# Patient Record
Sex: FEMALE | Race: WHITE | NOT HISPANIC OR LATINO | Employment: PART TIME | URBAN - METROPOLITAN AREA
[De-identification: names, ages, dates, MRNs, and addresses within clinical notes are randomized per-mention and may not be internally consistent; named-entity substitution may affect disease eponyms.]

---

## 2019-03-06 ENCOUNTER — OFFICE VISIT (OUTPATIENT)
Dept: FAMILY MEDICINE CLINIC | Facility: CLINIC | Age: 74
End: 2019-03-06
Payer: MEDICARE

## 2019-03-06 VITALS
DIASTOLIC BLOOD PRESSURE: 78 MMHG | HEART RATE: 105 BPM | HEIGHT: 65 IN | OXYGEN SATURATION: 98 % | WEIGHT: 185 LBS | RESPIRATION RATE: 18 BRPM | SYSTOLIC BLOOD PRESSURE: 136 MMHG | TEMPERATURE: 99.1 F | BODY MASS INDEX: 30.82 KG/M2

## 2019-03-06 DIAGNOSIS — J30.2 SEASONAL ALLERGIES: ICD-10-CM

## 2019-03-06 DIAGNOSIS — R10.11 ABDOMINAL PAIN, RUQ: ICD-10-CM

## 2019-03-06 DIAGNOSIS — R53.83 OTHER FATIGUE: ICD-10-CM

## 2019-03-06 DIAGNOSIS — Z12.11 COLON CANCER SCREENING: ICD-10-CM

## 2019-03-06 DIAGNOSIS — R05.9 COUGH: ICD-10-CM

## 2019-03-06 DIAGNOSIS — Z12.39 BREAST CANCER SCREENING: Primary | ICD-10-CM

## 2019-03-06 DIAGNOSIS — E78.5 HYPERLIPIDEMIA, UNSPECIFIED HYPERLIPIDEMIA TYPE: ICD-10-CM

## 2019-03-06 PROCEDURE — 99204 OFFICE O/P NEW MOD 45 MIN: CPT | Performed by: NURSE PRACTITIONER

## 2019-03-06 NOTE — PROGRESS NOTES
[facility]        NAME: Noemi Reddy is a 69 y o  female  : 1945    MRN: [id number]  DATE: 2019  TIME: 3:58 PM    Assessment and Plan   Breast cancer screening [Z12 31]  1  Breast cancer screening  Mammo screening bilateral w 3d & cad   2  Colon cancer screening  Ambulatory referral to Gastroenterology   3  Other fatigue  TSH, 3rd generation with Free T4 reflex   4  Abdominal pain, RUQ  Comprehensive metabolic panel    CBC and differential    Lipid panel   5  Cough  Lipid panel   6  Seasonal allergies  CBC and differential   7  Hyperlipidemia, unspecified hyperlipidemia type  Lipid panel       Gas pain vs biliary colic- no recent pain problems- will continue to monitor  Cough most likely due to allergies and PND- continue claritin and start flonase  Will check labs and call with results/follow up at visit in 1 month  Patient Instructions     Patient Instructions   Can try taking simethicone (gas x or beano) when having abdominal pain  Use flonase daily  Use humidifer at night  Fasting at least 8 hours for blood work  Return in 1 month for medicare wellness  Chief Complaint     Chief Complaint   Patient presents with    Abdominal Pain    Cough         History of Present Illness       Patient is a new patient  Has not had a physical in years  Reports never had any wellness testing done  Here today with abdominal pain and ongoing cough  Abdominal Pain   This is a new problem  The onset quality is sudden  The problem occurs intermittently  The pain is located in the RUQ  The quality of the pain is aching, burning and a sensation of fullness  The abdominal pain does not radiate  Associated symptoms include belching  Pertinent negatives include no anorexia, arthralgias, constipation, diarrhea, dysuria, fever, flatus, frequency, headaches, hematuria, melena, myalgias, nausea, vomiting or weight loss  Nothing aggravates the pain   The pain is relieved by nothing  Treatments tried: tums, unsure if they helped  There is no history of abdominal surgery, gallstones, GERD or pancreatitis  Cough   This is a chronic problem  The current episode started more than 1 year ago  The problem has been unchanged  The cough is non-productive  Associated symptoms include postnasal drip  Pertinent negatives include no chest pain, chills, ear pain, eye redness, fever, headaches, myalgias, rash, rhinorrhea, sore throat, shortness of breath, weight loss or wheezing  Treatments tried: claritin daily, used to use flonase but stopped because it was a steroid  Her past medical history is significant for bronchitis  There is no history of asthma, bronchiectasis, COPD, environmental allergies or pneumonia  Fatigue   This is a new problem  The current episode started more than 1 month ago  The problem occurs rarely  The problem has been unchanged  Associated symptoms include abdominal pain, coughing and fatigue  Pertinent negatives include no anorexia, arthralgias, chest pain, chills, congestion, fever, headaches, myalgias, nausea, rash, sore throat or vomiting  Nothing aggravates the symptoms  She has tried nothing for the symptoms  Review of Systems   Review of Systems   Constitutional: Positive for fatigue  Negative for activity change, appetite change, chills, fever and weight loss  HENT: Positive for postnasal drip  Negative for congestion, ear discharge, ear pain, rhinorrhea, sinus pressure, sinus pain, sore throat and voice change  Eyes: Negative for discharge and redness  Respiratory: Positive for cough  Negative for chest tightness, shortness of breath and wheezing  Cardiovascular: Negative for chest pain  Gastrointestinal: Positive for abdominal pain  Negative for anorexia, constipation, diarrhea, flatus, melena, nausea and vomiting  Genitourinary: Negative for dysuria, frequency and hematuria  Musculoskeletal: Negative for arthralgias and myalgias     Skin: Negative for color change and rash  Allergic/Immunologic: Negative for environmental allergies  Neurological: Negative for dizziness and headaches  Hematological: Negative for adenopathy  Current Medications     No current outpatient medications on file  Current Allergies     Allergies as of 03/06/2019    (No Known Allergies)            The following portions of the patient's history were reviewed and updated as appropriate: allergies, current medications, past family history, past medical history, past social history, past surgical history and problem list      No past medical history on file  Past Surgical History:   Procedure Laterality Date    WISDOM TOOTH EXTRACTION         Family History   Problem Relation Age of Onset    Lung cancer Father     Lung cancer Sister     Breast cancer Sister          Medications have been verified  BMI Counseling: Body mass index is 30 79 kg/m²  Discussed the patient's BMI with her  The BMI is above average  BMI counseling and education was provided to the patient  Nutrition recommendations include 3-5 servings of fruits/vegetables daily and consuming healthier snacks  Objective   /78   Pulse 105   Temp 99 1 °F (37 3 °C) (Tympanic)   Resp 18   Ht 5' 5" (1 651 m)   Wt 83 9 kg (185 lb)   SpO2 98%   BMI 30 79 kg/m²        Physical Exam     Physical Exam   Constitutional: She is oriented to person, place, and time  Vital signs are normal  She appears well-developed and well-nourished  She is cooperative  She does not appear ill  No distress  HENT:   Head: Normocephalic and atraumatic  Right Ear: Tympanic membrane, external ear and ear canal normal    Left Ear: Tympanic membrane, external ear and ear canal normal    Nose: Nose normal  No mucosal edema or rhinorrhea  Mouth/Throat: Uvula is midline, oropharynx is clear and moist and mucous membranes are normal  No posterior oropharyngeal edema or posterior oropharyngeal erythema  Eyes: Pupils are equal, round, and reactive to light  Conjunctivae, EOM and lids are normal    Cardiovascular: Normal rate, regular rhythm, S1 normal, S2 normal, normal heart sounds and intact distal pulses  Exam reveals no gallop and no friction rub  No murmur heard  Pulmonary/Chest: Effort normal and breath sounds normal  No accessory muscle usage or stridor  No respiratory distress  She has no decreased breath sounds  She has no wheezes  Abdominal: Soft  Normal appearance and bowel sounds are normal  She exhibits no distension and no mass  There is no tenderness  There is no rebound and no guarding  Musculoskeletal: Normal range of motion  She exhibits no edema, tenderness or deformity  Neurological: She is alert and oriented to person, place, and time  Skin: Skin is warm  Capillary refill takes less than 2 seconds  No rash noted  She is not diaphoretic  No erythema  Psychiatric: She has a normal mood and affect  Her behavior is normal  Thought content normal    Nursing note and vitals reviewed

## 2019-03-06 NOTE — PATIENT INSTRUCTIONS
Can try taking simethicone (gas x or beano) when having abdominal pain  Use flonase daily  Use humidifer at night  Fasting at least 8 hours for blood work  Return in 1 month for medicare wellness

## 2019-05-02 ENCOUNTER — HOSPITAL ENCOUNTER (OUTPATIENT)
Dept: MAMMOGRAPHY | Facility: HOSPITAL | Age: 74
Discharge: HOME | End: 2019-05-02
Payer: MEDICARE

## 2019-05-02 VITALS — HEIGHT: 65 IN | BODY MASS INDEX: 29.99 KG/M2 | WEIGHT: 180 LBS

## 2019-05-02 DIAGNOSIS — Z12.39 BREAST CANCER SCREENING: ICD-10-CM

## 2019-05-02 PROCEDURE — 77067 SCR MAMMO BI INCL CAD: CPT

## 2019-05-02 PROCEDURE — 77063 BREAST TOMOSYNTHESIS BI: CPT

## 2019-05-10 ENCOUNTER — HOSPITAL ENCOUNTER (OUTPATIENT)
Dept: ULTRASOUND IMAGING | Facility: HOSPITAL | Age: 74
Discharge: HOME | End: 2019-05-10
Payer: MEDICARE

## 2019-05-10 ENCOUNTER — HOSPITAL ENCOUNTER (OUTPATIENT)
Dept: MAMMOGRAPHY | Facility: HOSPITAL | Age: 74
Discharge: HOME | End: 2019-05-10
Payer: MEDICARE

## 2019-05-10 DIAGNOSIS — R92.8 ABNORMAL MAMMOGRAM: ICD-10-CM

## 2019-05-10 PROCEDURE — 76642 ULTRASOUND BREAST LIMITED: CPT

## 2019-05-10 PROCEDURE — 77065 DX MAMMO INCL CAD UNI: CPT

## 2019-05-10 PROCEDURE — G0279 TOMOSYNTHESIS, MAMMO: HCPCS

## 2019-05-22 ENCOUNTER — TRANSCRIBE ORDERS (OUTPATIENT)
Dept: ADMINISTRATIVE | Facility: HOSPITAL | Age: 74
End: 2019-05-22

## 2019-05-22 DIAGNOSIS — K59.00 CONSTIPATION, UNSPECIFIED CONSTIPATION TYPE: ICD-10-CM

## 2019-05-22 DIAGNOSIS — Z12.11 ENCOUNTER FOR SCREENING FOR MALIGNANT NEOPLASM OF COLON: ICD-10-CM

## 2019-05-22 DIAGNOSIS — R10.11 RUQ PAIN: ICD-10-CM

## 2019-05-22 DIAGNOSIS — R68.81 EARLY SATIETY: Primary | ICD-10-CM

## 2019-05-22 DIAGNOSIS — R05.9 COUGH: ICD-10-CM

## 2019-05-22 DIAGNOSIS — R19.4 CHANGE IN BOWEL HABIT: ICD-10-CM

## 2019-06-11 ENCOUNTER — ANESTHESIA EVENT (OUTPATIENT)
Dept: GASTROENTEROLOGY | Facility: HOSPITAL | Age: 74
End: 2019-06-11

## 2019-06-11 RX ORDER — FLUTICASONE PROPIONATE 50 MCG
1 SPRAY, SUSPENSION (ML) NASAL DAILY
COMMUNITY

## 2019-06-11 RX ORDER — DESONIDE 0.5 MG/G
1 CREAM TOPICAL DAILY PRN
COMMUNITY
End: 2019-06-12 | Stop reason: ALTCHOICE

## 2019-06-11 RX ORDER — NEOMYCIN SULFATE, POLYMYXIN B SULFATE AND DEXAMETHASONE 3.5; 10000; 1 MG/ML; [USP'U]/ML; MG/ML
1 SUSPENSION/ DROPS OPHTHALMIC 4 TIMES DAILY
Refills: 0 | COMMUNITY
Start: 2019-05-06 | End: 2019-06-11 | Stop reason: ALTCHOICE

## 2019-06-11 RX ORDER — LORATADINE 10 MG/1
10 TABLET ORAL EVERY MORNING
COMMUNITY

## 2019-06-12 ENCOUNTER — HOSPITAL ENCOUNTER (OUTPATIENT)
Dept: GASTROENTEROLOGY | Facility: HOSPITAL | Age: 74
Setting detail: OUTPATIENT SURGERY
Discharge: HOME | End: 2019-06-12
Attending: INTERNAL MEDICINE | Admitting: INTERNAL MEDICINE
Payer: MEDICARE

## 2019-06-12 ENCOUNTER — ANESTHESIA (OUTPATIENT)
Dept: GASTROENTEROLOGY | Facility: HOSPITAL | Age: 74
End: 2019-06-12

## 2019-06-12 VITALS
SYSTOLIC BLOOD PRESSURE: 120 MMHG | RESPIRATION RATE: 20 BRPM | OXYGEN SATURATION: 96 % | HEART RATE: 72 BPM | HEIGHT: 65 IN | TEMPERATURE: 97 F | DIASTOLIC BLOOD PRESSURE: 62 MMHG | WEIGHT: 180 LBS | BODY MASS INDEX: 29.99 KG/M2

## 2019-06-12 DIAGNOSIS — R68.81 EARLY SATIETY: ICD-10-CM

## 2019-06-12 DIAGNOSIS — Z12.11 ENCOUNTER FOR SCREENING FOR MALIGNANT NEOPLASM OF COLON: ICD-10-CM

## 2019-06-12 DIAGNOSIS — R10.11 RIGHT UPPER QUADRANT PAIN: ICD-10-CM

## 2019-06-12 PROCEDURE — 1124F ACP DISCUSS-NO DSCNMKR DOCD: CPT | Performed by: PATHOLOGY

## 2019-06-12 PROCEDURE — 88305 TISSUE EXAM BY PATHOLOGIST: CPT | Performed by: PATHOLOGY

## 2019-06-12 RX ORDER — SODIUM CHLORIDE, SODIUM LACTATE, POTASSIUM CHLORIDE, CALCIUM CHLORIDE 600; 310; 30; 20 MG/100ML; MG/100ML; MG/100ML; MG/100ML
125 INJECTION, SOLUTION INTRAVENOUS CONTINUOUS
Status: DISCONTINUED | OUTPATIENT
Start: 2019-06-12 | End: 2019-06-16 | Stop reason: HOSPADM

## 2019-06-12 RX ORDER — LIDOCAINE HYDROCHLORIDE 20 MG/ML
INJECTION, SOLUTION INFILTRATION; PERINEURAL AS NEEDED
Status: DISCONTINUED | OUTPATIENT
Start: 2019-06-12 | End: 2019-06-12 | Stop reason: SURG

## 2019-06-12 RX ORDER — PROPOFOL 10 MG/ML
INJECTION, EMULSION INTRAVENOUS AS NEEDED
Status: DISCONTINUED | OUTPATIENT
Start: 2019-06-12 | End: 2019-06-12 | Stop reason: SURG

## 2019-06-12 RX ADMIN — PROPOFOL 30 MG: 10 INJECTION, EMULSION INTRAVENOUS at 11:42

## 2019-06-12 RX ADMIN — PROPOFOL 100 MG: 10 INJECTION, EMULSION INTRAVENOUS at 11:20

## 2019-06-12 RX ADMIN — PROPOFOL 30 MG: 10 INJECTION, EMULSION INTRAVENOUS at 11:32

## 2019-06-12 RX ADMIN — PROPOFOL 30 MG: 10 INJECTION, EMULSION INTRAVENOUS at 11:46

## 2019-06-12 RX ADMIN — PROPOFOL 30 MG: 10 INJECTION, EMULSION INTRAVENOUS at 11:36

## 2019-06-12 RX ADMIN — PROPOFOL 30 MG: 10 INJECTION, EMULSION INTRAVENOUS at 11:41

## 2019-06-12 RX ADMIN — SODIUM CHLORIDE, POTASSIUM CHLORIDE, SODIUM LACTATE AND CALCIUM CHLORIDE 125 ML/HR: 600; 310; 30; 20 INJECTION, SOLUTION INTRAVENOUS at 09:27

## 2019-06-12 RX ADMIN — LIDOCAINE HYDROCHLORIDE 5 ML: 20 INJECTION, SOLUTION INFILTRATION; PERINEURAL at 11:20

## 2019-06-12 RX ADMIN — PROPOFOL 30 MG: 10 INJECTION, EMULSION INTRAVENOUS at 11:25

## 2019-06-12 RX ADMIN — PROPOFOL 30 MG: 10 INJECTION, EMULSION INTRAVENOUS at 11:39

## 2019-06-12 RX ADMIN — PROPOFOL 30 MG: 10 INJECTION, EMULSION INTRAVENOUS at 11:30

## 2019-06-12 RX ADMIN — PROPOFOL 30 MG: 10 INJECTION, EMULSION INTRAVENOUS at 11:22

## 2019-06-12 RX ADMIN — PROPOFOL 30 MG: 10 INJECTION, EMULSION INTRAVENOUS at 11:34

## 2019-06-12 RX ADMIN — PROPOFOL 30 MG: 10 INJECTION, EMULSION INTRAVENOUS at 11:28

## 2019-06-26 ENCOUNTER — OFFICE VISIT (OUTPATIENT)
Dept: FAMILY MEDICINE CLINIC | Facility: CLINIC | Age: 74
End: 2019-06-26
Payer: MEDICARE

## 2019-06-26 VITALS
RESPIRATION RATE: 18 BRPM | HEART RATE: 79 BPM | OXYGEN SATURATION: 99 % | BODY MASS INDEX: 30.49 KG/M2 | SYSTOLIC BLOOD PRESSURE: 132 MMHG | WEIGHT: 183 LBS | TEMPERATURE: 99.1 F | HEIGHT: 65 IN | DIASTOLIC BLOOD PRESSURE: 78 MMHG

## 2019-06-26 DIAGNOSIS — Z23 ENCOUNTER FOR ADMINISTRATION OF VACCINE: ICD-10-CM

## 2019-06-26 DIAGNOSIS — Z11.59 ENCOUNTER FOR HEPATITIS C SCREENING TEST FOR LOW RISK PATIENT: ICD-10-CM

## 2019-06-26 DIAGNOSIS — Z00.00 MEDICARE ANNUAL WELLNESS VISIT, INITIAL: Primary | ICD-10-CM

## 2019-06-26 PROBLEM — J30.2 SEASONAL ALLERGIES: Status: ACTIVE | Noted: 2019-06-26

## 2019-06-26 PROCEDURE — G0438 PPPS, INITIAL VISIT: HCPCS | Performed by: NURSE PRACTITIONER

## 2019-06-26 PROCEDURE — 90732 PPSV23 VACC 2 YRS+ SUBQ/IM: CPT

## 2019-06-26 PROCEDURE — 1124F ACP DISCUSS-NO DSCNMKR DOCD: CPT | Performed by: NURSE PRACTITIONER

## 2019-06-26 PROCEDURE — G0009 ADMIN PNEUMOCOCCAL VACCINE: HCPCS

## 2019-08-22 ENCOUNTER — APPOINTMENT (OUTPATIENT)
Dept: LAB | Facility: HOSPITAL | Age: 74
End: 2019-08-22
Payer: MEDICARE

## 2019-08-22 DIAGNOSIS — R53.83 OTHER FATIGUE: ICD-10-CM

## 2019-08-22 DIAGNOSIS — J30.2 SEASONAL ALLERGIES: ICD-10-CM

## 2019-08-22 DIAGNOSIS — R05.9 COUGH: ICD-10-CM

## 2019-08-22 DIAGNOSIS — E78.5 HYPERLIPIDEMIA, UNSPECIFIED HYPERLIPIDEMIA TYPE: ICD-10-CM

## 2019-08-22 DIAGNOSIS — R10.11 ABDOMINAL PAIN, RUQ: ICD-10-CM

## 2019-08-22 DIAGNOSIS — Z11.59 ENCOUNTER FOR HEPATITIS C SCREENING TEST FOR LOW RISK PATIENT: ICD-10-CM

## 2019-08-22 LAB
ALBUMIN SERPL BCP-MCNC: 4 G/DL (ref 3.5–5)
ALP SERPL-CCNC: 73 U/L (ref 46–116)
ALT SERPL W P-5'-P-CCNC: 26 U/L (ref 12–78)
ANION GAP SERPL CALCULATED.3IONS-SCNC: 5 MMOL/L (ref 4–13)
AST SERPL W P-5'-P-CCNC: 16 U/L (ref 5–45)
BASOPHILS # BLD AUTO: 0.02 THOUSANDS/ΜL (ref 0–0.1)
BASOPHILS NFR BLD AUTO: 0 % (ref 0–1)
BILIRUB SERPL-MCNC: 0.79 MG/DL (ref 0.2–1)
BUN SERPL-MCNC: 10 MG/DL (ref 5–25)
CALCIUM SERPL-MCNC: 9.2 MG/DL (ref 8.3–10.1)
CHLORIDE SERPL-SCNC: 107 MMOL/L (ref 100–108)
CHOLEST SERPL-MCNC: 250 MG/DL (ref 50–200)
CO2 SERPL-SCNC: 28 MMOL/L (ref 21–32)
CREAT SERPL-MCNC: 0.82 MG/DL (ref 0.6–1.3)
EOSINOPHIL # BLD AUTO: 0.11 THOUSAND/ΜL (ref 0–0.61)
EOSINOPHIL NFR BLD AUTO: 2 % (ref 0–6)
ERYTHROCYTE [DISTWIDTH] IN BLOOD BY AUTOMATED COUNT: 12.5 % (ref 11.6–15.1)
GFR SERPL CREATININE-BSD FRML MDRD: 71 ML/MIN/1.73SQ M
GLUCOSE P FAST SERPL-MCNC: 89 MG/DL (ref 65–99)
HCT VFR BLD AUTO: 46.6 % (ref 34.8–46.1)
HCV AB SER QL: NORMAL
HDLC SERPL-MCNC: 69 MG/DL (ref 40–60)
HGB BLD-MCNC: 15.6 G/DL (ref 11.5–15.4)
IMM GRANULOCYTES # BLD AUTO: 0.01 THOUSAND/UL (ref 0–0.2)
IMM GRANULOCYTES NFR BLD AUTO: 0 % (ref 0–2)
LDLC SERPL CALC-MCNC: 156 MG/DL (ref 0–100)
LYMPHOCYTES # BLD AUTO: 1.47 THOUSANDS/ΜL (ref 0.6–4.47)
LYMPHOCYTES NFR BLD AUTO: 27 % (ref 14–44)
MCH RBC QN AUTO: 29.6 PG (ref 26.8–34.3)
MCHC RBC AUTO-ENTMCNC: 33.5 G/DL (ref 31.4–37.4)
MCV RBC AUTO: 88 FL (ref 82–98)
MONOCYTES # BLD AUTO: 0.57 THOUSAND/ΜL (ref 0.17–1.22)
MONOCYTES NFR BLD AUTO: 10 % (ref 4–12)
NEUTROPHILS # BLD AUTO: 3.37 THOUSANDS/ΜL (ref 1.85–7.62)
NEUTS SEG NFR BLD AUTO: 61 % (ref 43–75)
NONHDLC SERPL-MCNC: 181 MG/DL
NRBC BLD AUTO-RTO: 0 /100 WBCS
PLATELET # BLD AUTO: 215 THOUSANDS/UL (ref 149–390)
PMV BLD AUTO: 10.4 FL (ref 8.9–12.7)
POTASSIUM SERPL-SCNC: 3.9 MMOL/L (ref 3.5–5.3)
PROT SERPL-MCNC: 7.5 G/DL (ref 6.4–8.2)
RBC # BLD AUTO: 5.27 MILLION/UL (ref 3.81–5.12)
SODIUM SERPL-SCNC: 140 MMOL/L (ref 136–145)
TRIGL SERPL-MCNC: 127 MG/DL
TSH SERPL DL<=0.05 MIU/L-ACNC: 1.88 UIU/ML (ref 0.36–3.74)
WBC # BLD AUTO: 5.55 THOUSAND/UL (ref 4.31–10.16)

## 2019-08-22 PROCEDURE — 80053 COMPREHEN METABOLIC PANEL: CPT

## 2019-08-22 PROCEDURE — 80061 LIPID PANEL: CPT

## 2019-08-22 PROCEDURE — 86803 HEPATITIS C AB TEST: CPT

## 2019-08-22 PROCEDURE — 85025 COMPLETE CBC W/AUTO DIFF WBC: CPT

## 2019-08-22 PROCEDURE — 84443 ASSAY THYROID STIM HORMONE: CPT

## 2019-08-22 PROCEDURE — 36415 COLL VENOUS BLD VENIPUNCTURE: CPT

## 2020-07-01 ENCOUNTER — OFFICE VISIT (OUTPATIENT)
Dept: FAMILY MEDICINE CLINIC | Facility: CLINIC | Age: 75
End: 2020-07-01
Payer: MEDICARE

## 2020-07-01 VITALS
SYSTOLIC BLOOD PRESSURE: 122 MMHG | OXYGEN SATURATION: 98 % | HEIGHT: 65 IN | RESPIRATION RATE: 18 BRPM | TEMPERATURE: 96.5 F | WEIGHT: 175 LBS | BODY MASS INDEX: 29.16 KG/M2 | HEART RATE: 75 BPM | DIASTOLIC BLOOD PRESSURE: 76 MMHG

## 2020-07-01 DIAGNOSIS — Z20.822 EXPOSURE TO COVID-19 VIRUS: ICD-10-CM

## 2020-07-01 DIAGNOSIS — E78.5 HYPERLIPIDEMIA, UNSPECIFIED HYPERLIPIDEMIA TYPE: ICD-10-CM

## 2020-07-01 DIAGNOSIS — Z00.00 ENCOUNTER FOR MEDICARE ANNUAL WELLNESS EXAM: ICD-10-CM

## 2020-07-01 DIAGNOSIS — R14.1 GAS PAIN: ICD-10-CM

## 2020-07-01 DIAGNOSIS — Z12.31 ENCOUNTER FOR SCREENING MAMMOGRAM FOR BREAST CANCER: Primary | ICD-10-CM

## 2020-07-01 PROBLEM — K57.90 DIVERTICULOSIS: Status: ACTIVE | Noted: 2020-07-01

## 2020-07-01 PROBLEM — E78.2 MIXED HYPERLIPIDEMIA: Status: ACTIVE | Noted: 2020-07-01

## 2020-07-01 PROCEDURE — 1160F RVW MEDS BY RX/DR IN RCRD: CPT | Performed by: NURSE PRACTITIONER

## 2020-07-01 PROCEDURE — G0439 PPPS, SUBSEQ VISIT: HCPCS | Performed by: NURSE PRACTITIONER

## 2020-07-01 PROCEDURE — 1170F FXNL STATUS ASSESSED: CPT | Performed by: NURSE PRACTITIONER

## 2020-07-01 PROCEDURE — 1125F AMNT PAIN NOTED PAIN PRSNT: CPT | Performed by: NURSE PRACTITIONER

## 2020-07-01 PROCEDURE — 4040F PNEUMOC VAC/ADMIN/RCVD: CPT | Performed by: NURSE PRACTITIONER

## 2020-07-01 PROCEDURE — 1123F ACP DISCUSS/DSCN MKR DOCD: CPT | Performed by: NURSE PRACTITIONER

## 2020-07-01 PROCEDURE — 3008F BODY MASS INDEX DOCD: CPT | Performed by: NURSE PRACTITIONER

## 2020-07-01 PROCEDURE — 1036F TOBACCO NON-USER: CPT | Performed by: NURSE PRACTITIONER

## 2020-07-01 NOTE — PROGRESS NOTES
Assessment and Plan:     Problem List Items Addressed This Visit     None      Visit Diagnoses     Encounter for screening mammogram for breast cancer    -  Primary    Relevant Orders    Mammo screening bilateral w 3d & cad    Encounter for Medicare annual wellness exam        Gas pain        Relevant Orders    CELIAC HLA-DQ,BLOOD    Celiac Disease Antibody Profile    Hyperlipidemia, unspecified hyperlipidemia type        Relevant Orders    Lipid panel    Comprehensive metabolic panel        BMI Counseling: Body mass index is 29 12 kg/m²  The BMI is above normal  Nutrition recommendations include encouraging healthy choices of fruits and vegetables, decreasing fast food intake, consuming healthier snacks, moderation in carbohydrate intake, increasing intake of lean protein, reducing intake of saturated and trans fat and reducing intake of cholesterol  Exercise recommendations include exercising 3-5 times per week  Preventive health issues were discussed with patient, and age appropriate screening tests were ordered as noted in patient's After Visit Summary  Personalized health advice and appropriate referrals for health education or preventive services given if needed, as noted in patient's After Visit Summary       History of Present Illness:     Patient presents for Medicare Annual Wellness visit    Patient Care Team:  Teresa Billingsley as PCP - General (Family Medicine)     Problem List:     Patient Active Problem List   Diagnosis    Medicare annual wellness visit, initial    Seasonal allergies    Diverticulosis    Mixed hyperlipidemia      Past Medical and Surgical History:     Past Medical History:   Diagnosis Date    Constipation     Rheumatic fever     as child    Rosacea     Seasonal allergies      Past Surgical History:   Procedure Laterality Date    TONSILLECTOMY      as child    TUBAL LIGATION      WISDOM TOOTH EXTRACTION        Family History:     Family History   Problem Relation Age of Onset    Lung cancer Father     Lung cancer Sister     Breast cancer Sister       Social History:        Social History     Socioeconomic History    Marital status: /Civil Union     Spouse name: None    Number of children: None    Years of education: None    Highest education level: None   Occupational History    None   Social Needs    Financial resource strain: None    Food insecurity:     Worry: None     Inability: None    Transportation needs:     Medical: None     Non-medical: None   Tobacco Use    Smoking status: Never Smoker    Smokeless tobacco: Never Used   Substance and Sexual Activity    Alcohol use: Yes     Frequency: Monthly or less     Drinks per session: 1 or 2     Binge frequency: Never    Drug use: Never    Sexual activity: None   Lifestyle    Physical activity:     Days per week: None     Minutes per session: None    Stress: None   Relationships    Social connections:     Talks on phone: None     Gets together: None     Attends Confucianism service: None     Active member of club or organization: None     Attends meetings of clubs or organizations: None     Relationship status: None    Intimate partner violence:     Fear of current or ex partner: None     Emotionally abused: None     Physically abused: None     Forced sexual activity: None   Other Topics Concern    None   Social History Narrative    None      Medications and Allergies:     Current Outpatient Medications   Medication Sig Dispense Refill    fluticasone (FLONASE) 50 mcg/act nasal spray 1 spray into each nostril daily      Ketotifen Fumarate (ALAWAY OP) Apply 1 drop to eye every morning      loratadine (CLARITIN) 10 mg tablet Take 10 mg by mouth every morning      metroNIDAZOLE (METROCREAM) 0 75 % cream Apply 1 application topically 2 (two) times a day   0    Nutritional Supplements (JUICE PLUS FIBRE PO) Take 1 capsule by mouth every morning       No current facility-administered medications for this visit  No Known Allergies   Immunizations:     Immunization History   Administered Date(s) Administered    Pneumococcal Polysaccharide PPV23 10/10/2011, 06/26/2019      Health Maintenance:         Topic Date Due    MAMMOGRAM  05/10/2020    CRC Screening: Colonoscopy  08/28/2022    Hepatitis C Screening  Completed         Topic Date Due    DTaP,Tdap,and Td Vaccines (1 - Tdap) 01/08/1956    Pneumococcal Vaccine: 65+ Years (2 of 2 - PCV13) 06/26/2020    Influenza Vaccine  07/01/2020      Medicare Health Risk Assessment:     /76   Pulse 75   Temp (!) 96 5 °F (35 8 °C)   Resp 18   Ht 5' 5" (1 651 m)   Wt 79 4 kg (175 lb)   SpO2 98%   BMI 29 12 kg/m²      Gabbie is here for her Subsequent Wellness visit  Health Risk Assessment:   Patient rates overall health as very good  Patient feels that their physical health rating is same  Eyesight was rated as same  Hearing was rated as same  Patient feels that their emotional and mental health rating is same  Pain experienced in the last 7 days has been none  Patient states that she has experienced no weight loss or gain in last 6 months  Depression Screening:   PHQ-2 Score: 0      Fall Risk Screening: In the past year, patient has experienced: no history of falling in past year      Urinary Incontinence Screening:   Patient has not leaked urine accidently in the last six months  Home Safety:  Patient does not have trouble with stairs inside or outside of their home  Patient has working smoke alarms and has working carbon monoxide detector  Home safety hazards include: none  Nutrition:   Current diet is Regular  Medications:   Patient is currently taking over-the-counter supplements  OTC medications include: see medication list  Patient is able to manage medications       Activities of Daily Living (ADLs)/Instrumental Activities of Daily Living (IADLs):   Walk and transfer into and out of bed and chair?: Yes  Dress and groom yourself?: Yes    Bathe or shower yourself?: Yes    Feed yourself? Yes  Do your laundry/housekeeping?: Yes  Manage your money, pay your bills and track your expenses?: Yes  Make your own meals?: Yes    Do your own shopping?: Yes    Previous Hospitalizations:   Any hospitalizations or ED visits within the last 12 months?: No      Advance Care Planning:   Living will: No    Durable POA for healthcare: No    Advanced directive: No      Cognitive Screening:   Provider or family/friend/caregiver concerned regarding cognition?: No    PREVENTIVE SCREENINGS      Cardiovascular Screening:    General: Screening Not Indicated, History Lipid Disorder, Screening Current and Risks and Benefits Discussed      Diabetes Screening:     General: Screening Current and Risks and Benefits Discussed    Due for: Blood Glucose      Colorectal Cancer Screening:     General: Screening Current      Breast Cancer Screening:     General: Risks and Benefits Discussed    Due for: Mammogram        Cervical Cancer Screening:    General: Screening Not Indicated      Osteoporosis Screening:    General: Screening Not Indicated      Abdominal Aortic Aneurysm (AAA) Screening:        General: Screening Not Indicated      Lung Cancer Screening:     General: Screening Not Indicated      Hepatitis C Screening:    General: Screening Current    Other Counseling Topics:   Car/seat belt/driving safety, sunscreen and regular weightbearing exercise and calcium and vitamin D intake         LAWSON Maldonado

## 2020-07-01 NOTE — PATIENT INSTRUCTIONS
Medicare Preventive Visit Patient Instructions  Thank you for completing your Welcome to Medicare Visit or Medicare Annual Wellness Visit today  Your next wellness visit will be due in one year (7/1/2021)  The screening/preventive services that you may require over the next 5-10 years are detailed below  Some tests may not apply to you based off risk factors and/or age  Screening tests ordered at today's visit but not completed yet may show as past due  Also, please note that scanned in results may not display below  Preventive Screenings:  Service Recommendations Previous Testing/Comments   Colorectal Cancer Screening  * Colonoscopy    * Fecal Occult Blood Test (FOBT)/Fecal Immunochemical Test (FIT)  * Fecal DNA/Cologuard Test  * Flexible Sigmoidoscopy Age: 54-65 years old   Colonoscopy: every 10 years (may be performed more frequently if at higher risk)  OR  FOBT/FIT: every 1 year  OR  Cologuard: every 3 years  OR  Sigmoidoscopy: every 5 years  Screening may be recommended earlier than age 48 if at higher risk for colorectal cancer  Also, an individualized decision between you and your healthcare provider will decide whether screening between the ages of 74-80 would be appropriate  Colonoscopy: 08/28/2019  FOBT/FIT: Not on file  Cologuard: Not on file  Sigmoidoscopy: Not on file    Screening Current     Breast Cancer Screening Age: 36 years old  Frequency: every 1-2 years  Not required if history of left and right mastectomy Mammogram: 05/10/2019    Screening Current   Cervical Cancer Screening Between the ages of 21-29, pap smear recommended once every 3 years  Between the ages of 33-67, can perform pap smear with HPV co-testing every 5 years     Recommendations may differ for women with a history of total hysterectomy, cervical cancer, or abnormal pap smears in past  Pap Smear: Not on file    Screening Not Indicated   Hepatitis C Screening Once for adults born between 1945 and 1965  More frequently in patients at high risk for Hepatitis C Hep C Antibody: 08/22/2019    Screening Current   Diabetes Screening 1-2 times per year if you're at risk for diabetes or have pre-diabetes Fasting glucose: 89 mg/dL   A1C: No results in last 5 years    Screening Current   Cholesterol Screening Once every 5 years if you don't have a lipid disorder  May order more often based on risk factors  Lipid panel: 08/22/2019    Screening Not Indicated  History Lipid Disorder     Other Preventive Screenings Covered by Medicare:  1  Abdominal Aortic Aneurysm (AAA) Screening: covered once if your at risk  You're considered to be at risk if you have a family history of AAA  2  Lung Cancer Screening: covers low dose CT scan once per year if you meet all of the following conditions: (1) Age 50-69; (2) No signs or symptoms of lung cancer; (3) Current smoker or have quit smoking within the last 15 years; (4) You have a tobacco smoking history of at least 30 pack years (packs per day multiplied by number of years you smoked); (5) You get a written order from a healthcare provider  3  Glaucoma Screening: covered annually if you're considered high risk: (1) You have diabetes OR (2) Family history of glaucoma OR (3)  aged 48 and older OR (3)  American aged 72 and older  3  Osteoporosis Screening: covered every 2 years if you meet one of the following conditions: (1) You're estrogen deficient and at risk for osteoporosis based off medical history and other findings; (2) Have a vertebral abnormality; (3) On glucocorticoid therapy for more than 3 months; (4) Have primary hyperparathyroidism; (5) On osteoporosis medications and need to assess response to drug therapy  · Last bone density test (DXA Scan): Not on file  5  HIV Screening: covered annually if you're between the age of 12-76  Also covered annually if you are younger than 13 and older than 72 with risk factors for HIV infection   For pregnant patients, it is covered up to 3 times per pregnancy  Immunizations:  Immunization Recommendations   Influenza Vaccine Annual influenza vaccination during flu season is recommended for all persons aged >= 6 months who do not have contraindications   Pneumococcal Vaccine (Prevnar and Pneumovax)  * Prevnar = PCV13  * Pneumovax = PPSV23   Adults 25-60 years old: 1-3 doses may be recommended based on certain risk factors  Adults 72 years old: Prevnar (PCV13) vaccine recommended followed by Pneumovax (PPSV23) vaccine  If already received PPSV23 since turning 65, then PCV13 recommended at least one year after PPSV23 dose  Hepatitis B Vaccine 3 dose series if at intermediate or high risk (ex: diabetes, end stage renal disease, liver disease)   Tetanus (Td) Vaccine - COST NOT COVERED BY MEDICARE PART B Following completion of primary series, a booster dose should be given every 10 years to maintain immunity against tetanus  Td may also be given as tetanus wound prophylaxis  Tdap Vaccine - COST NOT COVERED BY MEDICARE PART B Recommended at least once for all adults  For pregnant patients, recommended with each pregnancy  Shingles Vaccine (Shingrix) - COST NOT COVERED BY MEDICARE PART B  2 shot series recommended in those aged 48 and above     Health Maintenance Due:      Topic Date Due    MAMMOGRAM  05/10/2020    CRC Screening: Colonoscopy  08/28/2022    Hepatitis C Screening  Completed     Immunizations Due:      Topic Date Due    DTaP,Tdap,and Td Vaccines (1 - Tdap) 01/08/1956    Pneumococcal Vaccine: 65+ Years (2 of 2 - PCV13) 06/26/2020    Influenza Vaccine  07/01/2020     Advance Directives   What are advance directives? Advance directives are legal documents that state your wishes and plans for medical care  These plans are made ahead of time in case you lose your ability to make decisions for yourself  Advance directives can apply to any medical decision, such as the treatments you want, and if you want to donate organs  What are the types of advance directives? There are many types of advance directives, and each state has rules about how to use them  You may choose a combination of any of the following:  · Living will: This is a written record of the treatment you want  You can also choose which treatments you do not want, which to limit, and which to stop at a certain time  This includes surgery, medicine, IV fluid, and tube feedings  · Durable power of  for healthcare [facility]): This is a written record that states who you want to make healthcare choices for you when you are unable to make them for yourself  This person, called a proxy, is usually a family member or a friend  You may choose more than 1 proxy  · Do not resuscitate (DNR) order:  A DNR order is used in case your heart stops beating or you stop breathing  It is a request not to have certain forms of treatment, such as CPR  A DNR order may be included in other types of advance directives  · Medical directive: This covers the care that you want if you are in a coma, near death, or unable to make decisions for yourself  You can list the treatments you want for each condition  Treatment may include pain medicine, surgery, blood transfusions, dialysis, IV or tube feedings, and a ventilator (breathing machine)  · Values history: This document has questions about your views, beliefs, and how you feel and think about life  This information can help others choose the care that you would choose  Why are advance directives important? An advance directive helps you control your care  Although spoken wishes may be used, it is better to have your wishes written down  Spoken wishes can be misunderstood, or not followed  Treatments may be given even if you do not want them  An advance directive may make it easier for your family to make difficult choices about your care     Weight Management   Why it is important to manage your weight:  Being overweight increases your risk of health conditions such as heart disease, high blood pressure, type 2 diabetes, and certain types of cancer  It can also increase your risk for osteoarthritis, sleep apnea, and other respiratory problems  Aim for a slow, steady weight loss  Even a small amount of weight loss can lower your risk of health problems  How to lose weight safely:  A safe and healthy way to lose weight is to eat fewer calories and get regular exercise  You can lose up about 1 pound a week by decreasing the number of calories you eat by 500 calories each day  Healthy meal plan for weight management:  A healthy meal plan includes a variety of foods, contains fewer calories, and helps you stay healthy  A healthy meal plan includes the following:  · Eat whole-grain foods more often  A healthy meal plan should contain fiber  Fiber is the part of grains, fruits, and vegetables that is not broken down by your body  Whole-grain foods are healthy and provide extra fiber in your diet  Some examples of whole-grain foods are whole-wheat breads and pastas, oatmeal, brown rice, and bulgur  · Eat a variety of vegetables every day  Include dark, leafy greens such as spinach, kale, vasile greens, and mustard greens  Eat yellow and orange vegetables such as carrots, sweet potatoes, and winter squash  · Eat a variety of fruits every day  Choose fresh or canned fruit (canned in its own juice or light syrup) instead of juice  Fruit juice has very little or no fiber  · Eat low-fat dairy foods  Drink fat-free (skim) milk or 1% milk  Eat fat-free yogurt and low-fat cottage cheese  Try low-fat cheeses such as mozzarella and other reduced-fat cheeses  · Choose meat and other protein foods that are low in fat  Choose beans or other legumes such as split peas or lentils  Choose fish, skinless poultry (chicken or turkey), or lean cuts of red meat (beef or pork)  Before you cook meat or poultry, cut off any visible fat     · Use less fat and oil   Try baking foods instead of frying them  Add less fat, such as margarine, sour cream, regular salad dressing and mayonnaise to foods  Eat fewer high-fat foods  Some examples of high-fat foods include french fries, doughnuts, ice cream, and cakes  · Eat fewer sweets  Limit foods and drinks that are high in sugar  This includes candy, cookies, regular soda, and sweetened drinks  Exercise:  Exercise at least 30 minutes per day on most days of the week  Some examples of exercise include walking, biking, dancing, and swimming  You can also fit in more physical activity by taking the stairs instead of the elevator or parking farther away from stores  Ask your healthcare provider about the best exercise plan for you  © Copyright [employer] 2018 Information is for End User's use only and may not be sold, redistributed or otherwise used for commercial purposes   All illustrations and images included in CareNotes® are the copyrighted property of A D A M , Inc  or [address] [place] [address]

## 2020-09-09 ENCOUNTER — APPOINTMENT (OUTPATIENT)
Dept: LAB | Facility: CLINIC | Age: 75
End: 2020-09-09
Payer: MEDICARE

## 2020-09-09 ENCOUNTER — TRANSCRIBE ORDERS (OUTPATIENT)
Dept: URGENT CARE | Facility: CLINIC | Age: 75
End: 2020-09-09

## 2020-09-09 DIAGNOSIS — E78.5 HYPERLIPIDEMIA, UNSPECIFIED HYPERLIPIDEMIA TYPE: ICD-10-CM

## 2020-09-09 DIAGNOSIS — R19.4 CHANGE IN BOWEL HABITS: ICD-10-CM

## 2020-09-09 DIAGNOSIS — R14.1 GAS PAIN: ICD-10-CM

## 2020-09-09 DIAGNOSIS — R14.0 BLOATING: ICD-10-CM

## 2020-09-09 DIAGNOSIS — R19.4 CHANGE IN BOWEL HABITS: Primary | ICD-10-CM

## 2020-09-09 DIAGNOSIS — Z20.822 EXPOSURE TO COVID-19 VIRUS: ICD-10-CM

## 2020-09-09 LAB
ALBUMIN SERPL BCP-MCNC: 4.1 G/DL (ref 3.5–5)
ALP SERPL-CCNC: 62 U/L (ref 46–116)
ALT SERPL W P-5'-P-CCNC: 20 U/L (ref 12–78)
ANION GAP SERPL CALCULATED.3IONS-SCNC: 7 MMOL/L (ref 4–13)
AST SERPL W P-5'-P-CCNC: 17 U/L (ref 5–45)
BILIRUB SERPL-MCNC: 0.69 MG/DL (ref 0.2–1)
BUN SERPL-MCNC: 8 MG/DL (ref 5–25)
CALCIUM SERPL-MCNC: 9.5 MG/DL (ref 8.3–10.1)
CHLORIDE SERPL-SCNC: 110 MMOL/L (ref 100–108)
CHOLEST SERPL-MCNC: 231 MG/DL (ref 50–200)
CO2 SERPL-SCNC: 25 MMOL/L (ref 21–32)
CREAT SERPL-MCNC: 0.82 MG/DL (ref 0.6–1.3)
GFR SERPL CREATININE-BSD FRML MDRD: 71 ML/MIN/1.73SQ M
GLUCOSE P FAST SERPL-MCNC: 85 MG/DL (ref 65–99)
HDLC SERPL-MCNC: 73 MG/DL
LDLC SERPL CALC-MCNC: 133 MG/DL (ref 0–100)
NONHDLC SERPL-MCNC: 158 MG/DL
POTASSIUM SERPL-SCNC: 4.2 MMOL/L (ref 3.5–5.3)
PROT SERPL-MCNC: 7 G/DL (ref 6.4–8.2)
SARS-COV-2 IGG+IGM SERPL QL IA: NORMAL
SODIUM SERPL-SCNC: 142 MMOL/L (ref 136–145)
TRIGL SERPL-MCNC: 126 MG/DL

## 2020-09-09 PROCEDURE — 86255 FLUORESCENT ANTIBODY SCREEN: CPT

## 2020-09-09 PROCEDURE — 86769 SARS-COV-2 COVID-19 ANTIBODY: CPT

## 2020-09-09 PROCEDURE — 36415 COLL VENOUS BLD VENIPUNCTURE: CPT

## 2020-09-09 PROCEDURE — 80053 COMPREHEN METABOLIC PANEL: CPT

## 2020-09-09 PROCEDURE — 82784 ASSAY IGA/IGD/IGG/IGM EACH: CPT

## 2020-09-09 PROCEDURE — 83516 IMMUNOASSAY NONANTIBODY: CPT

## 2020-09-09 PROCEDURE — 80061 LIPID PANEL: CPT

## 2020-09-10 LAB
ENDOMYSIUM IGA SER QL: NEGATIVE
GLIADIN PEPTIDE IGA SER-ACNC: 3 UNITS (ref 0–19)
GLIADIN PEPTIDE IGG SER-ACNC: 2 UNITS (ref 0–19)
IGA SERPL-MCNC: 113 MG/DL (ref 64–422)
TTG IGA SER-ACNC: <2 U/ML (ref 0–3)
TTG IGG SER-ACNC: 4 U/ML (ref 0–5)

## 2020-09-16 LAB — HLA-DQ2 QL: NORMAL

## 2021-01-20 DIAGNOSIS — Z23 ENCOUNTER FOR IMMUNIZATION: ICD-10-CM

## 2021-02-03 ENCOUNTER — IMMUNIZATIONS (OUTPATIENT)
Dept: FAMILY MEDICINE CLINIC | Facility: HOSPITAL | Age: 76
End: 2021-02-03

## 2021-02-03 DIAGNOSIS — Z23 ENCOUNTER FOR IMMUNIZATION: Primary | ICD-10-CM

## 2021-02-03 PROCEDURE — 0011A SARS-COV-2 / COVID-19 MRNA VACCINE (MODERNA) 100 MCG: CPT

## 2021-02-03 PROCEDURE — 91301 SARS-COV-2 / COVID-19 MRNA VACCINE (MODERNA) 100 MCG: CPT

## 2021-03-02 ENCOUNTER — IMMUNIZATIONS (OUTPATIENT)
Dept: FAMILY MEDICINE CLINIC | Facility: HOSPITAL | Age: 76
End: 2021-03-02

## 2021-03-02 DIAGNOSIS — Z23 ENCOUNTER FOR IMMUNIZATION: Primary | ICD-10-CM

## 2021-03-02 PROCEDURE — 0012A SARS-COV-2 / COVID-19 MRNA VACCINE (MODERNA) 100 MCG: CPT

## 2021-03-02 PROCEDURE — 91301 SARS-COV-2 / COVID-19 MRNA VACCINE (MODERNA) 100 MCG: CPT

## 2021-07-09 ENCOUNTER — OFFICE VISIT (OUTPATIENT)
Dept: FAMILY MEDICINE CLINIC | Facility: CLINIC | Age: 76
End: 2021-07-09
Payer: MEDICARE

## 2021-07-09 VITALS
TEMPERATURE: 98.9 F | BODY MASS INDEX: 29.32 KG/M2 | WEIGHT: 176 LBS | HEIGHT: 65 IN | OXYGEN SATURATION: 98 % | SYSTOLIC BLOOD PRESSURE: 116 MMHG | RESPIRATION RATE: 20 BRPM | DIASTOLIC BLOOD PRESSURE: 72 MMHG | HEART RATE: 70 BPM

## 2021-07-09 DIAGNOSIS — Z00.00 ENCOUNTER FOR MEDICARE ANNUAL WELLNESS EXAM: ICD-10-CM

## 2021-07-09 DIAGNOSIS — E78.2 MIXED HYPERLIPIDEMIA: ICD-10-CM

## 2021-07-09 DIAGNOSIS — Z12.31 SCREENING MAMMOGRAM, ENCOUNTER FOR: Primary | ICD-10-CM

## 2021-07-09 PROCEDURE — G0439 PPPS, SUBSEQ VISIT: HCPCS | Performed by: NURSE PRACTITIONER

## 2021-07-09 PROCEDURE — 1123F ACP DISCUSS/DSCN MKR DOCD: CPT | Performed by: NURSE PRACTITIONER

## 2021-07-09 NOTE — PATIENT INSTRUCTIONS
Medicare Preventive Visit Patient Instructions  Thank you for completing your Welcome to Medicare Visit or Medicare Annual Wellness Visit today  Your next wellness visit will be due in one year (7/10/2022)  The screening/preventive services that you may require over the next 5-10 years are detailed below  Some tests may not apply to you based off risk factors and/or age  Screening tests ordered at today's visit but not completed yet may show as past due  Also, please note that scanned in results may not display below  Preventive Screenings:  Service Recommendations Previous Testing/Comments   Colorectal Cancer Screening  * Colonoscopy    * Fecal Occult Blood Test (FOBT)/Fecal Immunochemical Test (FIT)  * Fecal DNA/Cologuard Test  * Flexible Sigmoidoscopy Age: 54-65 years old   Colonoscopy: every 10 years (may be performed more frequently if at higher risk)  OR  FOBT/FIT: every 1 year  OR  Cologuard: every 3 years  OR  Sigmoidoscopy: every 5 years  Screening may be recommended earlier than age 48 if at higher risk for colorectal cancer  Also, an individualized decision between you and your healthcare provider will decide whether screening between the ages of 74-80 would be appropriate  Colonoscopy: 08/28/2019  FOBT/FIT: Not on file  Cologuard: Not on file  Sigmoidoscopy: Not on file    Screening Current     Breast Cancer Screening Age: 36 years old  Frequency: every 1-2 years  Not required if history of left and right mastectomy Mammogram: 05/10/2019        Cervical Cancer Screening Between the ages of 21-29, pap smear recommended once every 3 years  Between the ages of 33-67, can perform pap smear with HPV co-testing every 5 years     Recommendations may differ for women with a history of total hysterectomy, cervical cancer, or abnormal pap smears in past  Pap Smear: Not on file    Screening Not Indicated   Hepatitis C Screening Once for adults born between 1945 and 1965  More frequently in patients at high risk for Hepatitis C Hep C Antibody: 08/22/2019    Screening Current   Diabetes Screening 1-2 times per year if you're at risk for diabetes or have pre-diabetes Fasting glucose: 85 mg/dL   A1C: No results in last 5 years    Screening Current   Cholesterol Screening Once every 5 years if you don't have a lipid disorder  May order more often based on risk factors  Lipid panel: 09/09/2020    Screening Not Indicated  History Lipid Disorder     Other Preventive Screenings Covered by Medicare:  1  Abdominal Aortic Aneurysm (AAA) Screening: covered once if your at risk  You're considered to be at risk if you have a family history of AAA  2  Lung Cancer Screening: covers low dose CT scan once per year if you meet all of the following conditions: (1) Age 50-69; (2) No signs or symptoms of lung cancer; (3) Current smoker or have quit smoking within the last 15 years; (4) You have a tobacco smoking history of at least 30 pack years (packs per day multiplied by number of years you smoked); (5) You get a written order from a healthcare provider  3  Glaucoma Screening: covered annually if you're considered high risk: (1) You have diabetes OR (2) Family history of glaucoma OR (3)  aged 48 and older OR (3)  American aged 72 and older  3  Osteoporosis Screening: covered every 2 years if you meet one of the following conditions: (1) You're estrogen deficient and at risk for osteoporosis based off medical history and other findings; (2) Have a vertebral abnormality; (3) On glucocorticoid therapy for more than 3 months; (4) Have primary hyperparathyroidism; (5) On osteoporosis medications and need to assess response to drug therapy  · Last bone density test (DXA Scan): Not on file  5  HIV Screening: covered annually if you're between the age of 12-76  Also covered annually if you are younger than 13 and older than 72 with risk factors for HIV infection   For pregnant patients, it is covered up to 3 times per pregnancy  Immunizations:  Immunization Recommendations   Influenza Vaccine Annual influenza vaccination during flu season is recommended for all persons aged >= 6 months who do not have contraindications   Pneumococcal Vaccine (Prevnar and Pneumovax)  * Prevnar = PCV13  * Pneumovax = PPSV23   Adults 25-60 years old: 1-3 doses may be recommended based on certain risk factors  Adults 72 years old: Prevnar (PCV13) vaccine recommended followed by Pneumovax (PPSV23) vaccine  If already received PPSV23 since turning 65, then PCV13 recommended at least one year after PPSV23 dose  Hepatitis B Vaccine 3 dose series if at intermediate or high risk (ex: diabetes, end stage renal disease, liver disease)   Tetanus (Td) Vaccine - COST NOT COVERED BY MEDICARE PART B Following completion of primary series, a booster dose should be given every 10 years to maintain immunity against tetanus  Td may also be given as tetanus wound prophylaxis  Tdap Vaccine - COST NOT COVERED BY MEDICARE PART B Recommended at least once for all adults  For pregnant patients, recommended with each pregnancy  Shingles Vaccine (Shingrix) - COST NOT COVERED BY MEDICARE PART B  2 shot series recommended in those aged 48 and above     Health Maintenance Due:      Topic Date Due    Colorectal Cancer Screening  08/28/2022    Hepatitis C Screening  Completed     Immunizations Due:      Topic Date Due    DTaP,Tdap,and Td Vaccines (1 - Tdap) Never done    Influenza Vaccine (1) 09/01/2021     Advance Directives   What are advance directives? Advance directives are legal documents that state your wishes and plans for medical care  These plans are made ahead of time in case you lose your ability to make decisions for yourself  Advance directives can apply to any medical decision, such as the treatments you want, and if you want to donate organs  What are the types of advance directives?   There are many types of advance directives, and each state has rules about how to use them  You may choose a combination of any of the following:  · Living will: This is a written record of the treatment you want  You can also choose which treatments you do not want, which to limit, and which to stop at a certain time  This includes surgery, medicine, IV fluid, and tube feedings  · Durable power of  for healthcare [facility]): This is a written record that states who you want to make healthcare choices for you when you are unable to make them for yourself  This person, called a proxy, is usually a family member or a friend  You may choose more than 1 proxy  · Do not resuscitate (DNR) order:  A DNR order is used in case your heart stops beating or you stop breathing  It is a request not to have certain forms of treatment, such as CPR  A DNR order may be included in other types of advance directives  · Medical directive: This covers the care that you want if you are in a coma, near death, or unable to make decisions for yourself  You can list the treatments you want for each condition  Treatment may include pain medicine, surgery, blood transfusions, dialysis, IV or tube feedings, and a ventilator (breathing machine)  · Values history: This document has questions about your views, beliefs, and how you feel and think about life  This information can help others choose the care that you would choose  Why are advance directives important? An advance directive helps you control your care  Although spoken wishes may be used, it is better to have your wishes written down  Spoken wishes can be misunderstood, or not followed  Treatments may be given even if you do not want them  An advance directive may make it easier for your family to make difficult choices about your care     Weight Management   Why it is important to manage your weight:  Being overweight increases your risk of health conditions such as heart disease, high blood pressure, type 2 diabetes, and certain types of cancer  It can also increase your risk for osteoarthritis, sleep apnea, and other respiratory problems  Aim for a slow, steady weight loss  Even a small amount of weight loss can lower your risk of health problems  How to lose weight safely:  A safe and healthy way to lose weight is to eat fewer calories and get regular exercise  You can lose up about 1 pound a week by decreasing the number of calories you eat by 500 calories each day  Healthy meal plan for weight management:  A healthy meal plan includes a variety of foods, contains fewer calories, and helps you stay healthy  A healthy meal plan includes the following:  · Eat whole-grain foods more often  A healthy meal plan should contain fiber  Fiber is the part of grains, fruits, and vegetables that is not broken down by your body  Whole-grain foods are healthy and provide extra fiber in your diet  Some examples of whole-grain foods are whole-wheat breads and pastas, oatmeal, brown rice, and bulgur  · Eat a variety of vegetables every day  Include dark, leafy greens such as spinach, kale, vasile greens, and mustard greens  Eat yellow and orange vegetables such as carrots, sweet potatoes, and winter squash  · Eat a variety of fruits every day  Choose fresh or canned fruit (canned in its own juice or light syrup) instead of juice  Fruit juice has very little or no fiber  · Eat low-fat dairy foods  Drink fat-free (skim) milk or 1% milk  Eat fat-free yogurt and low-fat cottage cheese  Try low-fat cheeses such as mozzarella and other reduced-fat cheeses  · Choose meat and other protein foods that are low in fat  Choose beans or other legumes such as split peas or lentils  Choose fish, skinless poultry (chicken or turkey), or lean cuts of red meat (beef or pork)  Before you cook meat or poultry, cut off any visible fat  · Use less fat and oil  Try baking foods instead of frying them   Add less fat, such as margarine, sour cream, regular salad dressing and mayonnaise to foods  Eat fewer high-fat foods  Some examples of high-fat foods include french fries, doughnuts, ice cream, and cakes  · Eat fewer sweets  Limit foods and drinks that are high in sugar  This includes candy, cookies, regular soda, and sweetened drinks  Exercise:  Exercise at least 30 minutes per day on most days of the week  Some examples of exercise include walking, biking, dancing, and swimming  You can also fit in more physical activity by taking the stairs instead of the elevator or parking farther away from stores  Ask your healthcare provider about the best exercise plan for you  © Copyright [employer] 2018 Information is for End User's use only and may not be sold, redistributed or otherwise used for commercial purposes   All illustrations and images included in CareNotes® are the copyrighted property of A [employer] A M , Inc  or [address] [place] [address] [facility]

## 2021-07-09 NOTE — PROGRESS NOTES
Assessment and Plan:     Problem List Items Addressed This Visit        Other    Mixed hyperlipidemia    Relevant Orders    Lipid panel    Comprehensive metabolic panel      Other Visit Diagnoses     Screening mammogram, encounter for    -  Primary    Relevant Orders    Mammo screening bilateral w 3d & cad    Encounter for Medicare annual wellness exam        Relevant Orders    Lipid panel    Comprehensive metabolic panel      1  Screening mammogram, encounter for  Will get this done this year  overdue  - Mammo screening bilateral w 3d & cad; Future    2  Mixed hyperlipidemia    - Lipid panel; Future  - Comprehensive metabolic panel; Future    3  Encounter for Medicare annual wellness exam    - Lipid panel; Future  - Comprehensive metabolic panel; Future    BMI Counseling: Body mass index is 29 29 kg/m²  The BMI is above normal  Nutrition recommendations include encouraging healthy choices of fruits and vegetables, consuming healthier snacks, limiting drinks that contain sugar, moderation in carbohydrate intake, reducing intake of saturated and trans fat and reducing intake of cholesterol  Exercise recommendations include exercising 3-5 times per week  Preventive health issues were discussed with patient, and age appropriate screening tests were ordered as noted in patient's After Visit Summary  Personalized health advice and appropriate referrals for health education or preventive services given if needed, as noted in patient's After Visit Summary       History of Present Illness:     Patient presents for Medicare Annual Wellness visit    Patient Care Team:  Milus Aase, CRNP as PCP - General (Family Medicine)     Problem List:     Patient Active Problem List   Diagnosis    Medicare annual wellness visit, initial    Seasonal allergies    Diverticulosis    Mixed hyperlipidemia      Past Medical and Surgical History:     Past Medical History:   Diagnosis Date    Constipation     Rheumatic fever as child    Rosacea     Seasonal allergies      Past Surgical History:   Procedure Laterality Date    TONSILLECTOMY      as child    TUBAL LIGATION      WISDOM TOOTH EXTRACTION        Family History:     Family History   Problem Relation Age of Onset    Lung cancer Father     Lung cancer Sister     Breast cancer Sister       Social History:     Social History     Socioeconomic History    Marital status: /Civil Union     Spouse name: None    Number of children: None    Years of education: None    Highest education level: None   Occupational History    None   Tobacco Use    Smoking status: Never Smoker    Smokeless tobacco: Never Used   Substance and Sexual Activity    Alcohol use: Yes    Drug use: Never    Sexual activity: None   Other Topics Concern    None   Social History Narrative    None     Social Determinants of Health     Financial Resource Strain:     Difficulty of Paying Living Expenses:    Food Insecurity:     Worried About Running Out of Food in the Last Year:     [address] in the Last Year:    Transportation Needs:     Lack of Transportation (Medical):      Lack of Transportation (Non-Medical):    Physical Activity:     Days of Exercise per Week:     Minutes of Exercise per Session:    Stress:     Feeling of Stress :    Social Connections:     Frequency of Communication with Friends and Family:     Frequency of Social Gatherings with Friends and Family:     Attends [employer] Services:     Active Member of Clubs or Organizations:     Attends Club or Organization Meetings:     Marital Status:    Intimate Partner Violence:     Fear of Current or Ex-Partner:     Emotionally Abused:     Physically Abused:     Sexually Abused:       Medications and Allergies:     Current Outpatient Medications   Medication Sig Dispense Refill    fluticasone (FLONASE) 50 mcg/act nasal spray 1 spray into each nostril daily      Ketotifen Fumarate (ALAWAY OP) Apply 1 drop to eye every morning      loratadine (CLARITIN) 10 mg tablet Take 10 mg by mouth every morning      Nutritional Supplements (JUICE PLUS FIBRE PO) Take 1 capsule by mouth every morning      Probiotic Product (PROBIOTIC PO) Take by mouth      metroNIDAZOLE (METROCREAM) 0 75 % cream Apply 1 application topically 2 (two) times a day  (Patient not taking: Reported on 7/9/2021)  0     No current facility-administered medications for this visit  No Known Allergies   Immunizations:     Immunization History   Administered Date(s) Administered    Pneumococcal Polysaccharide PPV23 10/10/2011, 06/26/2019    SARS-CoV-2 / COVID-19 mRNA IM (Vinay Grossman) 02/03/2021, 03/02/2021      Health Maintenance:         Topic Date Due    Colorectal Cancer Screening  08/28/2022    Hepatitis C Screening  Completed         Topic Date Due    DTaP,Tdap,and Td Vaccines (1 - Tdap) Never done      Medicare Health Risk Assessment:     /72   Pulse 70   Temp 98 9 °F (37 2 °C)   Resp 20   Ht 5' 5" (1 651 m)   Wt 79 8 kg (176 lb)   SpO2 98%   BMI 29 29 kg/m²      Gabbie is here for her Subsequent Wellness visit  Health Risk Assessment:   Patient rates overall health as excellent  Patient feels that their physical health rating is same  Patient is very satisfied with their life  Eyesight was rated as same  Hearing was rated as same  Patient feels that their emotional and mental health rating is same  Patients states they are never, rarely angry  Patient states they are sometimes unusually tired/fatigued  Pain experienced in the last 7 days has been some  Patient's pain rating has been 3/10  Patient states that she has experienced no weight loss or gain in last 6 months  Taking fiber supplement and probiotic  Depression Screening:   PHQ-2 Score: 0      Fall Risk Screening:    In the past year, patient has experienced: no history of falling in past year      Urinary Incontinence Screening:   Patient has not leaked urine accidently in the last six months  Home Safety:  Patient has trouble with stairs inside or outside of their home  Patient has working smoke alarms and has no working carbon monoxide detector  Home safety hazards include: none  Nutrition:   Current diet is Regular  Medications:   Patient is currently taking over-the-counter supplements  OTC medications include: see medication list  Patient is able to manage medications  Activities of Daily Living (ADLs)/Instrumental Activities of Daily Living (IADLs):   Walk and transfer into and out of bed and chair?: Yes  Dress and groom yourself?: Yes    Bathe or shower yourself?: Yes    Feed yourself?  Yes  Do your laundry/housekeeping?: Yes  Manage your money, pay your bills and track your expenses?: Yes  Make your own meals?: Yes    Do your own shopping?: Yes    Previous Hospitalizations:   Any hospitalizations or ED visits within the last 12 months?: No      Advance Care Planning:   Living will: Yes    Durable POA for healthcare: No    Advanced directive: Yes      Cognitive Screening:   Provider or family/friend/caregiver concerned regarding cognition?: No    PREVENTIVE SCREENINGS      Cardiovascular Screening:    General: History Lipid Disorder and Screening Current      Diabetes Screening:     General: Screening Current      Colorectal Cancer Screening:     General: Screening Current      Breast Cancer Screening:     General: Risks and Benefits Discussed    Due for: Mammogram        Cervical Cancer Screening:    General: Screening Not Indicated      Osteoporosis Screening:    General: Screening Not Indicated      Abdominal Aortic Aneurysm (AAA) Screening:        General: Screening Not Indicated      Lung Cancer Screening:     General: Screening Not Indicated      Hepatitis C Screening:    General: Screening Current    Screening, Brief Intervention, and Referral to Treatment (SBIRT)    Screening  Typical number of drinks in a day: 0  Typical number of drinks in a week: 0  Interpretation: Low risk drinking behavior  Single Item Drug Screening:  How often have you used an illegal drug (including marijuana) or a prescription medication for non-medical reasons in the past year? never    Single Item Drug Screen Score: 0  Interpretation: Negative screen for possible drug use disorder    Brief Intervention  Alcohol & drug use screenings were reviewed  No concerns regarding substance use disorder identified  Other Counseling Topics:   Car/seat belt/driving safety, skin self-exam, sunscreen and regular weightbearing exercise and calcium and vitamin D intake         LAWSON Goncalves

## 2021-12-29 ENCOUNTER — TELEPHONE (OUTPATIENT)
Dept: FAMILY MEDICINE CLINIC | Facility: CLINIC | Age: 76
End: 2021-12-29

## 2021-12-29 NOTE — TELEPHONE ENCOUNTER
At this point getting the antibodies test is not helpful  Either way the booster vaccine is recommended as people who have had both vaccines are testing positive for COVID  I recommend that she get the booster vaccine and testing is not going to tell  us much  If she had the vaccine then it will be positive

## 2022-01-03 NOTE — TELEPHONE ENCOUNTER
Patient stated that she would still like to have her antibody test done, I let her know would inform the provider to see if the order can be placed,

## 2022-01-04 DIAGNOSIS — Z20.822 ENCOUNTER FOR SCREENING LABORATORY TESTING FOR COVID-19 VIRUS IN ASYMPTOMATIC PATIENT: Primary | ICD-10-CM

## 2022-01-07 ENCOUNTER — APPOINTMENT (OUTPATIENT)
Dept: LAB | Facility: CLINIC | Age: 77
End: 2022-01-07
Payer: MEDICARE

## 2022-01-07 DIAGNOSIS — Z20.822 ENCOUNTER FOR SCREENING LABORATORY TESTING FOR COVID-19 VIRUS IN ASYMPTOMATIC PATIENT: ICD-10-CM

## 2022-01-07 PROCEDURE — 36415 COLL VENOUS BLD VENIPUNCTURE: CPT

## 2022-01-07 PROCEDURE — 86769 SARS-COV-2 COVID-19 ANTIBODY: CPT

## 2022-01-08 LAB
SARS-COV-2 IGG SERPL QL IA: REACTIVE
SARS-COV-2 IGG+IGM SERPL QL IA: REACTIVE

## 2022-03-31 ENCOUNTER — CONSULT (OUTPATIENT)
Dept: GASTROENTEROLOGY | Facility: CLINIC | Age: 77
End: 2022-03-31
Payer: MEDICARE

## 2022-03-31 ENCOUNTER — TELEPHONE (OUTPATIENT)
Dept: GASTROENTEROLOGY | Facility: AMBULARY SURGERY CENTER | Age: 77
End: 2022-03-31

## 2022-03-31 VITALS
OXYGEN SATURATION: 98 % | TEMPERATURE: 97.6 F | BODY MASS INDEX: 29.72 KG/M2 | WEIGHT: 178.6 LBS | DIASTOLIC BLOOD PRESSURE: 78 MMHG | SYSTOLIC BLOOD PRESSURE: 122 MMHG | HEART RATE: 74 BPM | RESPIRATION RATE: 18 BRPM

## 2022-03-31 DIAGNOSIS — R05.3 CHRONIC COUGH: ICD-10-CM

## 2022-03-31 DIAGNOSIS — K59.00 CONSTIPATION, UNSPECIFIED CONSTIPATION TYPE: Primary | ICD-10-CM

## 2022-03-31 DIAGNOSIS — K21.9 GASTROESOPHAGEAL REFLUX DISEASE, UNSPECIFIED WHETHER ESOPHAGITIS PRESENT: ICD-10-CM

## 2022-03-31 DIAGNOSIS — Z86.010 HISTORY OF COLONIC POLYPS: ICD-10-CM

## 2022-03-31 PROCEDURE — 99204 OFFICE O/P NEW MOD 45 MIN: CPT | Performed by: INTERNAL MEDICINE

## 2022-03-31 RX ORDER — PANTOPRAZOLE SODIUM 40 MG/1
40 TABLET, DELAYED RELEASE ORAL DAILY
Qty: 30 TABLET | Refills: 1 | Status: SHIPPED | OUTPATIENT
Start: 2022-03-31 | End: 2022-07-19 | Stop reason: SDUPTHER

## 2022-03-31 NOTE — PATIENT INSTRUCTIONS
Miralax (generic is polyethylene glycol) for constipation  Protonix (generic is pantoprazole) sent to pharmacy for GERD   Take every day 30-60 minutes before first meal of the day    Scheduled date of EGD(as of today): 4/22/2022  Physician performing EGD: Dr Yamileth Navarrete  Location of EGD: [facility]  Instructions reviewed with patient by: Larisa Davis  Clearances:  None

## 2022-03-31 NOTE — PROGRESS NOTES
[facility] - Outpatient Consultation  DR [facility] 69 [facility]  female MRN: [id number]  Encounter: [id number]          ASSESSMENT AND PLAN:      1  Chronic cough   Broad differential for symptoms of chronic cough that has been present for the past few months  There seems to be mild correlating symptoms of GERD including occasional heartburn  Certainly chronic cough could be an atypical symptom of GERD although this tends to be an unlikely manifestation  Will plan to rule out GERD as a possible cause for her cough with an empiric trial of pantoprazole 40 mg daily  She was counseled on appropriate use  In addition will plan for upper endoscopy to rule out esophagitis or other abnormalities given her intermittent complaints of heartburn and regurgitation  I have also ordered a chest x-ray to rule out any obvious lung pathology for her symptoms  If GI evaluation is unremarkable for an underlying cause for her cough, she warrants further workup to rule out other differentials including chronic lung disease is, postnasal drip, or ENT etiology  - Ambulatory referral to Gastroenterology  - XR chest pa & lateral; Future  - pantoprazole (PROTONIX) 40 mg tablet; Take 1 tablet (40 mg total) by mouth daily  Dispense: 30 tablet; Refill: 1  - EGD; Future    2  Constipation, unspecified constipation type  Mild constipation more noticeable over the past several months  She initially responded well to fiber supplementation but she is noticing that she is still going infrequently  Stool is not particularly hard however she can go several days without bowel movement  There is no corresponding abdominal pain with this  I recommended that given that she is already taking fiber supplementation, to add in daily MiraLax to assess if this helps with more regularity  If this is not working would consider other treatment options    She has been on a stool softener and this has not made a major difference, so I have recommended discontinuing at this time  3  History of colonic polyps  Based on review of colonoscopy from 2019 as well as corresponding pathology, she would be due for repeat colonoscopy in 2024 for surveillance  4  Gastroesophageal reflux disease, unspecified whether esophagitis present  - pantoprazole (PROTONIX) 40 mg tablet; Take 1 tablet (40 mg total) by mouth daily  Dispense: 30 tablet; Refill: 1  - EGD; Future    ______________________________________________________________________    HPI:  Ms Marquise Gutierrez is a pleasant healthy 78-year-old female here by referral from her PCP due to concerns of chronic cough  She has noted a cough for the past few months that is very distressing to her  She says this cough alarms other people as she will have significant severe fits of coughing at times  There is no particular time of day that this is worse however sometimes she notices with length that  However this can occur when she is completely upright at work as well  She is not noticing relationship to timing of meals in the symptoms  She feels like she has to clear her throat constantly and she will feel the irritation resulting in a cough  She denies shortness of breath  She denies weight loss  She does have occasional heartburn but this is not a daily event  She tried nasal sprays as this was thought to maybe be related to allergies but this has made no difference  She is a nonsmoker  She had an upper endoscopy in 2019 which demonstrated gastritis and duodenitis  Biopsies were negative for significant pathology  There was an impression of possible GERD although no mention of esophagitis at that time  Last colonoscopy was also in 2019 at which time 3 polyps were found, however only 1 was tubular adenoma  For this reason she was recommended to return in 5 years, that she would be due in 2024         REVIEW OF SYSTEMS:    CONSTITUTIONAL: Denies any fever, chills, rigors, and weight loss   HEENT: Denies odynophagia, tinnitus  CARDIOVASCULAR: No chest pain or palpitations  RESPIRATORY: +cough, no hemoptysis, shortness of breath or dyspnea on exertion  GASTROINTESTINAL: As noted in the History of Present Illness  GENITOURINARY: No problems with urination  Denies any hematuria or dysuria  NEUROLOGIC: No dizziness or vertigo, denies headaches  MUSCULOSKELETAL: Denies any muscle or joint pain  SKIN: Denies skin rashes or itching  ENDOCRINE:  Denies intolerance to heat or cold  PSYCHOSOCIAL: Denies depression or anxiety  Denies any recent memory loss  Historical Information   Past Medical History:   Diagnosis Date    Constipation     Rheumatic fever     as child    Rosacea     Seasonal allergies      Past Surgical History:   Procedure Laterality Date    TONSILLECTOMY      as child    TUBAL LIGATION      WISDOM TOOTH EXTRACTION       Social History   Social History     Substance and Sexual Activity   Alcohol Use Yes     Social History     Substance and Sexual Activity   Drug Use Never     Social History     Tobacco Use   Smoking Status Never Smoker   Smokeless Tobacco Never Used     Family History   Problem Relation Age of Onset    Lung cancer Father     Lung cancer Sister     Breast cancer Sister        Meds/Allergies       Current Outpatient Medications:     azelastine (ASTELIN) 0 1 % nasal spray    fluticasone (FLONASE) 50 mcg/act nasal spray    Ketotifen Fumarate (ALAWAY OP)    loratadine (CLARITIN) 10 mg tablet    Nutritional Supplements (JUICE PLUS FIBRE PO)    Probiotic Product (PROBIOTIC PO)    fluticasone (FLONASE) 50 mcg/act nasal spray    metroNIDAZOLE (METROCREAM) 0 75 % cream    pantoprazole (PROTONIX) 40 mg tablet    No Known Allergies        Objective     Blood pressure 122/78, pulse 74, temperature 97 6 °F (36 4 °C), resp  rate 18, weight 81 kg (178 lb 9 6 oz), SpO2 98 %  Body mass index is 29 72 kg/m²          PHYSICAL EXAM:      General Appearance:   Alert, cooperative, no distress   HEENT:   Normocephalic, atraumatic, anicteric  Neck:  Supple, symmetrical, trachea midline   Lungs:   Clear to auscultation bilaterally; no rales, rhonchi or wheezing; respirations unlabored    Heart[de-identified]   Regular rate and rhythm; no murmur, rub, or gallop  Abdomen:   Soft, non-tender, non-distended; normal bowel sounds; no masses, no organomegaly    Genitalia:   Deferred    Rectal:   Deferred    Extremities:  No cyanosis, clubbing or edema    Pulses:  2+ and symmetric    Skin:  No jaundice, rashes, or lesions          Lab Results:   No visits with results within 1 Day(s) from this visit  Latest known visit with results is:   Appointment on 01/07/2022   Component Date Value    SARS-CoV-2 Ab, Total (Ig* 01/07/2022 Reactive*    SARS-CoV-2 Ab, IgG 01/07/2022 Reactive*         Radiology Results:   No results found

## 2022-03-31 NOTE — TELEPHONE ENCOUNTER
Patient is scheduled for EGD on April 22 , 2022 at [place] [address] with Orly Aguilar   Patient is aware of pre-procedure prep of Nothing to eat of drink after midnight and they will be called the day prior between 2 and 6 pm for time to report for procedure  Pre-procedure prep has been given to the patient  in person  on March 31 , 2022  GEN: awake, alert, well appearing, NAD   HENT: atraumatic, normocephalic, ASHLEY, EOMI, no midline instability, oropharynx w/o erythema or exudates, no lymphadenopathy  CV: normal rate and rhythm, S1, S2, no MRG, equal pulses throughout, no JVD  RESP: no distress, no IWOB, no retraction, clear to auscultation bilaterally   ABD: soft, L sided ttp, nondistended, no rebound, no guarding, normoactive bowel sounds, no organomegally  MUSCULOSKELETAL: strenght 5/5 x 4, full range of motion, CMS intact   SKIN: normal color, no turgor, no wounds or rash   NEURO: Awake alert oriented x 3, no facial asymmetry, no slurred speech, no pronator drift, moving all extremities  PSYCH: no suicial ideation, no homicidal ideation, no depression, no anxiety, no hallucination

## 2022-07-19 ENCOUNTER — PREP FOR PROCEDURE (OUTPATIENT)
Dept: GASTROENTEROLOGY | Facility: CLINIC | Age: 77
End: 2022-07-19

## 2022-07-19 ENCOUNTER — OFFICE VISIT (OUTPATIENT)
Dept: FAMILY MEDICINE CLINIC | Facility: CLINIC | Age: 77
End: 2022-07-19
Payer: MEDICARE

## 2022-07-19 ENCOUNTER — TELEPHONE (OUTPATIENT)
Dept: GASTROENTEROLOGY | Facility: CLINIC | Age: 77
End: 2022-07-19

## 2022-07-19 VITALS
DIASTOLIC BLOOD PRESSURE: 78 MMHG | OXYGEN SATURATION: 98 % | WEIGHT: 182 LBS | HEIGHT: 65 IN | TEMPERATURE: 98.8 F | SYSTOLIC BLOOD PRESSURE: 124 MMHG | HEART RATE: 63 BPM | BODY MASS INDEX: 30.32 KG/M2

## 2022-07-19 DIAGNOSIS — R05.3 CHRONIC COUGH: ICD-10-CM

## 2022-07-19 DIAGNOSIS — E78.2 MIXED HYPERLIPIDEMIA: ICD-10-CM

## 2022-07-19 DIAGNOSIS — R14.3 FLATULENCE: ICD-10-CM

## 2022-07-19 DIAGNOSIS — J30.2 SEASONAL ALLERGIES: ICD-10-CM

## 2022-07-19 DIAGNOSIS — K21.9 GASTROESOPHAGEAL REFLUX DISEASE, UNSPECIFIED WHETHER ESOPHAGITIS PRESENT: Primary | ICD-10-CM

## 2022-07-19 DIAGNOSIS — Z78.0 POST-MENOPAUSAL: ICD-10-CM

## 2022-07-19 DIAGNOSIS — K21.9 GASTROESOPHAGEAL REFLUX DISEASE, UNSPECIFIED WHETHER ESOPHAGITIS PRESENT: ICD-10-CM

## 2022-07-19 DIAGNOSIS — Z00.00 ENCOUNTER FOR MEDICARE ANNUAL WELLNESS EXAM: Primary | ICD-10-CM

## 2022-07-19 DIAGNOSIS — Z12.31 SCREENING MAMMOGRAM FOR BREAST CANCER: ICD-10-CM

## 2022-07-19 PROCEDURE — G0439 PPPS, SUBSEQ VISIT: HCPCS | Performed by: NURSE PRACTITIONER

## 2022-07-19 PROCEDURE — 99213 OFFICE O/P EST LOW 20 MIN: CPT | Performed by: NURSE PRACTITIONER

## 2022-07-19 RX ORDER — PANTOPRAZOLE SODIUM 40 MG/1
40 TABLET, DELAYED RELEASE ORAL DAILY
Qty: 30 TABLET | Refills: 5 | Status: SHIPPED | OUTPATIENT
Start: 2022-07-19 | End: 2022-08-24

## 2022-07-19 NOTE — PATIENT INSTRUCTIONS
Medicare Preventive Visit Patient Instructions  Thank you for completing your Welcome to Medicare Visit or Medicare Annual Wellness Visit today  Your next wellness visit will be due in one year (7/20/2023)  The screening/preventive services that you may require over the next 5-10 years are detailed below  Some tests may not apply to you based off risk factors and/or age  Screening tests ordered at today's visit but not completed yet may show as past due  Also, please note that scanned in results may not display below  Preventive Screenings:  Service Recommendations Previous Testing/Comments   Colorectal Cancer Screening  * Colonoscopy    * Fecal Occult Blood Test (FOBT)/Fecal Immunochemical Test (FIT)  * Fecal DNA/Cologuard Test  * Flexible Sigmoidoscopy Age: 54-65 years old   Colonoscopy: every 10 years (may be performed more frequently if at higher risk)  OR  FOBT/FIT: every 1 year  OR  Cologuard: every 3 years  OR  Sigmoidoscopy: every 5 years  Screening may be recommended earlier than age 48 if at higher risk for colorectal cancer  Also, an individualized decision between you and your healthcare provider will decide whether screening between the ages of 74-80 would be appropriate  Colonoscopy: 08/28/2019  FOBT/FIT: Not on file  Cologuard: Not on file  Sigmoidoscopy: Not on file    Screening Current     Breast Cancer Screening Age: 36 years old  Frequency: every 1-2 years  Not required if history of left and right mastectomy Mammogram: 05/10/2019        Cervical Cancer Screening Between the ages of 21-29, pap smear recommended once every 3 years  Between the ages of 33-67, can perform pap smear with HPV co-testing every 5 years     Recommendations may differ for women with a history of total hysterectomy, cervical cancer, or abnormal pap smears in past  Pap Smear: Not on file    Screening Not Indicated   Hepatitis C Screening Once for adults born between 1945 and 1965  More frequently in patients at high risk for Hepatitis C Hep C Antibody: 08/22/2019    Screening Current   Diabetes Screening 1-2 times per year if you're at risk for diabetes or have pre-diabetes Fasting glucose: 85 mg/dL   A1C: No results in last 5 years        Cholesterol Screening Once every 5 years if you don't have a lipid disorder  May order more often based on risk factors  Lipid panel: 09/09/2020    Screening Not Indicated  History Lipid Disorder     Other Preventive Screenings Covered by Medicare:  1  Abdominal Aortic Aneurysm (AAA) Screening: covered once if your at risk  You're considered to be at risk if you have a family history of AAA  2  Lung Cancer Screening: covers low dose CT scan once per year if you meet all of the following conditions: (1) Age 50-69; (2) No signs or symptoms of lung cancer; (3) Current smoker or have quit smoking within the last 15 years; (4) You have a tobacco smoking history of at least 30 pack years (packs per day multiplied by number of years you smoked); (5) You get a written order from a healthcare provider  3  Glaucoma Screening: covered annually if you're considered high risk: (1) You have diabetes OR (2) Family history of glaucoma OR (3)  aged 48 and older OR (3)  American aged 72 and older  3  Osteoporosis Screening: covered every 2 years if you meet one of the following conditions: (1) You're estrogen deficient and at risk for osteoporosis based off medical history and other findings; (2) Have a vertebral abnormality; (3) On glucocorticoid therapy for more than 3 months; (4) Have primary hyperparathyroidism; (5) On osteoporosis medications and need to assess response to drug therapy  · Last bone density test (DXA Scan): Not on file  5  HIV Screening: covered annually if you're between the age of 12-76  Also covered annually if you are younger than 13 and older than 72 with risk factors for HIV infection   For pregnant patients, it is covered up to 3 times per pregnancy  Immunizations:  Immunization Recommendations   Influenza Vaccine Annual influenza vaccination during flu season is recommended for all persons aged >= 6 months who do not have contraindications   Pneumococcal Vaccine (Prevnar and Pneumovax)  * Prevnar = PCV13  * Pneumovax = PPSV23   Adults 25-60 years old: 1-3 doses may be recommended based on certain risk factors  Adults 72 years old: Prevnar (PCV13) vaccine recommended followed by Pneumovax (PPSV23) vaccine  If already received PPSV23 since turning 65, then PCV13 recommended at least one year after PPSV23 dose  Hepatitis B Vaccine 3 dose series if at intermediate or high risk (ex: diabetes, end stage renal disease, liver disease)   Tetanus (Td) Vaccine - COST NOT COVERED BY MEDICARE PART B Following completion of primary series, a booster dose should be given every 10 years to maintain immunity against tetanus  Td may also be given as tetanus wound prophylaxis  Tdap Vaccine - COST NOT COVERED BY MEDICARE PART B Recommended at least once for all adults  For pregnant patients, recommended with each pregnancy  Shingles Vaccine (Shingrix) - COST NOT COVERED BY MEDICARE PART B  2 shot series recommended in those aged 48 and above     Health Maintenance Due:      Topic Date Due    Colorectal Cancer Screening  08/28/2022    Breast Cancer Screening: Mammogram  07/19/2023 (Originally 5/10/2020)    Hepatitis C Screening  Completed     Immunizations Due:      Topic Date Due    Pneumococcal Vaccine: 65+ Years (2 - PCV) 06/26/2020    COVID-19 Vaccine (3 - Booster for Moderna series) 08/02/2021    Influenza Vaccine (1) 09/01/2022     Advance Directives   What are advance directives? Advance directives are legal documents that state your wishes and plans for medical care  These plans are made ahead of time in case you lose your ability to make decisions for yourself   Advance directives can apply to any medical decision, such as the treatments you want, and if you want to donate organs  What are the types of advance directives? There are many types of advance directives, and each state has rules about how to use them  You may choose a combination of any of the following:  · Living will: This is a written record of the treatment you want  You can also choose which treatments you do not want, which to limit, and which to stop at a certain time  This includes surgery, medicine, IV fluid, and tube feedings  · Durable power of  for healthcare [facility]): This is a written record that states who you want to make healthcare choices for you when you are unable to make them for yourself  This person, called a proxy, is usually a family member or a friend  You may choose more than 1 proxy  · Do not resuscitate (DNR) order:  A DNR order is used in case your heart stops beating or you stop breathing  It is a request not to have certain forms of treatment, such as CPR  A DNR order may be included in other types of advance directives  · Medical directive: This covers the care that you want if you are in a coma, near death, or unable to make decisions for yourself  You can list the treatments you want for each condition  Treatment may include pain medicine, surgery, blood transfusions, dialysis, IV or tube feedings, and a ventilator (breathing machine)  · Values history: This document has questions about your views, beliefs, and how you feel and think about life  This information can help others choose the care that you would choose  Why are advance directives important? An advance directive helps you control your care  Although spoken wishes may be used, it is better to have your wishes written down  Spoken wishes can be misunderstood, or not followed  Treatments may be given even if you do not want them  An advance directive may make it easier for your family to make difficult choices about your care     Weight Management   Why it is important to manage your weight:  Being overweight increases your risk of health conditions such as heart disease, high blood pressure, type 2 diabetes, and certain types of cancer  It can also increase your risk for osteoarthritis, sleep apnea, and other respiratory problems  Aim for a slow, steady weight loss  Even a small amount of weight loss can lower your risk of health problems  How to lose weight safely:  A safe and healthy way to lose weight is to eat fewer calories and get regular exercise  You can lose up about 1 pound a week by decreasing the number of calories you eat by 500 calories each day  Healthy meal plan for weight management:  A healthy meal plan includes a variety of foods, contains fewer calories, and helps you stay healthy  A healthy meal plan includes the following:  · Eat whole-grain foods more often  A healthy meal plan should contain fiber  Fiber is the part of grains, fruits, and vegetables that is not broken down by your body  Whole-grain foods are healthy and provide extra fiber in your diet  Some examples of whole-grain foods are whole-wheat breads and pastas, oatmeal, brown rice, and bulgur  · Eat a variety of vegetables every day  Include dark, leafy greens such as spinach, kale, vasile greens, and mustard greens  Eat yellow and orange vegetables such as carrots, sweet potatoes, and winter squash  · Eat a variety of fruits every day  Choose fresh or canned fruit (canned in its own juice or light syrup) instead of juice  Fruit juice has very little or no fiber  · Eat low-fat dairy foods  Drink fat-free (skim) milk or 1% milk  Eat fat-free yogurt and low-fat cottage cheese  Try low-fat cheeses such as mozzarella and other reduced-fat cheeses  · Choose meat and other protein foods that are low in fat  Choose beans or other legumes such as split peas or lentils  Choose fish, skinless poultry (chicken or turkey), or lean cuts of red meat (beef or pork)   Before you cook meat or poultry, cut off any visible fat  · Use less fat and oil  Try baking foods instead of frying them  Add less fat, such as margarine, sour cream, regular salad dressing and mayonnaise to foods  Eat fewer high-fat foods  Some examples of high-fat foods include french fries, doughnuts, ice cream, and cakes  · Eat fewer sweets  Limit foods and drinks that are high in sugar  This includes candy, cookies, regular soda, and sweetened drinks  Exercise:  Exercise at least 30 minutes per day on most days of the week  Some examples of exercise include walking, biking, dancing, and swimming  You can also fit in more physical activity by taking the stairs instead of the elevator or parking farther away from stores  Ask your healthcare provider about the best exercise plan for you  © Copyright [employer] 2018 Information is for End User's use only and may not be sold, redistributed or otherwise used for commercial purposes   All illustrations and images included in CareNotes® are the copyrighted property of [employer]  or [address] [place] [address]

## 2022-07-19 NOTE — PROGRESS NOTES
Assessment and Plan:     Problem List Items Addressed This Visit        Other    Seasonal allergies    Mixed hyperlipidemia    Relevant Orders    Lipid panel    Comprehensive metabolic panel      Other Visit Diagnoses     Encounter for Medicare annual wellness exam    -  Primary    Relevant Orders    Lipid panel    Comprehensive metabolic panel    CBC and differential    DXA bone density spine hip and pelvis    Chronic cough        Relevant Medications    pantoprazole (PROTONIX) 40 mg tablet    Gastroesophageal reflux disease, unspecified whether esophagitis present        Relevant Medications    pantoprazole (PROTONIX) 40 mg tablet    Other Relevant Orders    CBC and differential    DXA bone density spine hip and pelvis    Screening mammogram for breast cancer        Relevant Orders    Mammo screening bilateral w 3d & cad    Post-menopausal        Relevant Orders    DXA bone density spine hip and pelvis    Flatulence          1  Chronic cough  - will restart protonix and get EGD scheduled  - pantoprazole (PROTONIX) 40 mg tablet; Take 1 tablet (40 mg total) by mouth daily  Dispense: 30 tablet; Refill: 5    2  Gastroesophageal reflux disease, unspecified whether esophagitis present  See chronic cough  - pantoprazole (PROTONIX) 40 mg tablet; Take 1 tablet (40 mg total) by mouth daily  Dispense: 30 tablet; Refill: 5  - CBC and differential; Future  - DXA bone density spine hip and pelvis; Future    3  Encounter for Medicare annual wellness exam    - Lipid panel; Future  - Comprehensive metabolic panel; Future  - CBC and differential; Future  - DXA bone density spine hip and pelvis; Future    4  Mixed hyperlipidemia   recheck, last checked was 2 years ago  - Lipid panel; Future  - Comprehensive metabolic panel; Future    5  Seasonal allergies  Continue claritin and nasal spray  6  Screening mammogram for breast cancer    - Mammo screening bilateral w 3d & cad; Future    7   Post-menopausal    - DXA bone density spine hip and pelvis; Future    8  Flatulence  - discussed diet and potential foods that can cause gas  Will try to make dietary changes to see if this helps  BMI Counseling: Body mass index is 30 29 kg/m²  The BMI is above normal  Nutrition recommendations include encouraging healthy choices of fruits and vegetables, consuming healthier snacks, moderation in carbohydrate intake, reducing intake of saturated and trans fat and reducing intake of cholesterol  Exercise recommendations include exercising 3-5 times per week  Rationale for BMI follow-up plan is due to patient being overweight or obese  Depression Screening and Follow-up Plan: Patient was screened for depression during today's encounter  They screened negative with a PHQ-2 score of 0  Preventive health issues were discussed with patient, and age appropriate screening tests were ordered as noted in patient's After Visit Summary  Personalized health advice and appropriate referrals for health education or preventive services given if needed, as noted in patient's After Visit Summary  History of Present Illness:     Patient presents for a Medicare Wellness Visit    Patient here for AWV  With chronic cough  Reports talking too much or singing  Does have post nasal drip  Also has GERD  Was supposed to have EGD but forgot about visit and was in [place] when she was supposed to get this done    Having some increased gas pain, flatulence  Patient Care Team:  Steven Agee as PCP - General (Family Medicine)     Review of Systems:     Review of Systems   Constitutional: Negative  Respiratory: Negative for shortness of breath and wheezing  Cardiovascular: Negative for chest pain and palpitations  Gastrointestinal:        Increased gas pain   Neurological: Negative  Negative for dizziness and light-headedness  Psychiatric/Behavioral: Negative           Problem List:     Patient Active Problem List   Diagnosis    Medicare annual wellness visit, initial    Seasonal allergies    Diverticulosis    Mixed hyperlipidemia      Past Medical and Surgical History:     Past Medical History:   Diagnosis Date    Constipation     Rheumatic fever     as child    Rosacea     Seasonal allergies      Past Surgical History:   Procedure Laterality Date    TONSILLECTOMY      as child    TUBAL LIGATION      WISDOM TOOTH EXTRACTION        Family History:     Family History   Problem Relation Age of Onset    Lung cancer Father     Lung cancer Sister     Breast cancer Sister       Social History:     Social History     Socioeconomic History    Marital status: /Civil Union     Spouse name: None    Number of children: None    Years of education: None    Highest education level: None   Occupational History    None   Tobacco Use    Smoking status: Never Smoker    Smokeless tobacco: Never Used   Vaping Use    Vaping Use: Never used   Substance and Sexual Activity    Alcohol use:  Yes    Drug use: Never    Sexual activity: None   Other Topics Concern    None   Social History Narrative    None     Social Determinants of Health     Financial Resource Strain: Not on file   Food Insecurity: Not on file   Transportation Needs: Not on file   Physical Activity: Not on file   Stress: Not on file   Social Connections: Not on file   Intimate Partner Violence: Not on file   Housing Stability: Not on file      Medications and Allergies:     Current Outpatient Medications   Medication Sig Dispense Refill    azelastine (ASTELIN) 0 1 % nasal spray 1 spray into each nostril 2 (two) times a day Use in each nostril as directed 30 mL 11    fluticasone (FLONASE) 50 mcg/act nasal spray 1 spray into each nostril daily      Ketotifen Fumarate (ALAWAY OP) Apply 1 drop to eye every morning      loratadine (CLARITIN) 10 mg tablet Take 10 mg by mouth every morning      Nutritional Supplements (JUICE PLUS FIBRE PO) Take 1 capsule by mouth every morning      pantoprazole (PROTONIX) 40 mg tablet Take 1 tablet (40 mg total) by mouth daily 30 tablet 5    Probiotic Product (PROBIOTIC PO) Take by mouth (Patient not taking: Reported on 7/19/2022)       No current facility-administered medications for this visit  No Known Allergies   Immunizations:     Immunization History   Administered Date(s) Administered    COVID-19 MODERNA VACC 0 5 ML IM 02/03/2021, 03/02/2021    Pneumococcal Polysaccharide PPV23 10/10/2011, 06/26/2019      Health Maintenance:         Topic Date Due    Colorectal Cancer Screening  08/28/2022    Breast Cancer Screening: Mammogram  07/19/2023 (Originally 5/10/2020)    Hepatitis C Screening  Completed         Topic Date Due    Pneumococcal Vaccine: 65+ Years (2 - PCV) 06/26/2020    COVID-19 Vaccine (3 - Booster for Moderna series) 08/02/2021    Influenza Vaccine (1) 09/01/2022      Medicare Screening Tests and Risk Assessments:     Lola Epstein is here for her Subsequent Wellness visit  Last Medicare Wellness visit information reviewed, patient interviewed, no change since last AWV  Health Risk Assessment:   Patient rates overall health as very good  Patient feels that their physical health rating is same  Patient is very satisfied with their life  Eyesight was rated as slightly worse  Hearing was rated as same  Patient feels that their emotional and mental health rating is same  Patients states they are never, rarely angry  Patient states they are sometimes unusually tired/fatigued  Pain experienced in the last 7 days has been none  Patient states that she has experienced weight loss or gain in last 6 months  Depression Screening:   PHQ-2 Score: 0      Fall Risk Screening: In the past year, patient has experienced: no history of falling in past year      Urinary Incontinence Screening:   Patient has not leaked urine accidently in the last six months       Home Safety:  Patient does not have trouble with stairs inside or outside of their home  Patient has no working smoke alarms and has no working carbon monoxide detector  Home safety hazards include: none  Nutrition:   Current diet is Regular and Limited junk food  Medications:   Patient is not currently taking any over-the-counter supplements  Patient is able to manage medications  Activities of Daily Living (ADLs)/Instrumental Activities of Daily Living (IADLs):   Walk and transfer into and out of bed and chair?: Yes  Dress and groom yourself?: Yes    Bathe or shower yourself?: Yes    Feed yourself? Yes  Do your laundry/housekeeping?: Yes  Manage your money, pay your bills and track your expenses?: Yes  Make your own meals?: Yes    Do your own shopping?: Yes    Previous Hospitalizations:   Any hospitalizations or ED visits within the last 12 months?: No      Advance Care Planning:   Living will: Yes    Durable POA for healthcare:  Yes    Advanced directive: Yes    Five wishes given: No    Patient declined ACP directive: Yes      Cognitive Screening:   Provider or family/friend/caregiver concerned regarding cognition?: No    PREVENTIVE SCREENINGS      Cardiovascular Screening:    General: History Lipid Disorder and Risks and Benefits Discussed    Due for: Lipid Panel      Diabetes Screening:     General: Risks and Benefits Discussed    Due for: Blood Glucose      Colorectal Cancer Screening:     General: Screening Current      Breast Cancer Screening:     General: Risks and Benefits Discussed    Due for: Mammogram        Cervical Cancer Screening:    General: Screening Not Indicated      Osteoporosis Screening:    General: Risks and Benefits Discussed    Due for: Bone Density Ultrasound      Abdominal Aortic Aneurysm (AAA) Screening:        General: Screening Not Indicated      Lung Cancer Screening:     General: Screening Not Indicated      Hepatitis C Screening:    General: Screening Current    Screening, Brief Intervention, and Referral to Treatment (SBIRT)    Screening  Typical number of drinks in a day: 0  Typical number of drinks in a week: 0  Interpretation: Low risk drinking behavior  Single Item Drug Screening:  How often have you used an illegal drug (including marijuana) or a prescription medication for non-medical reasons in the past year? never    Single Item Drug Screen Score: 0  Interpretation: Negative screen for possible drug use disorder    Brief Intervention  Alcohol & drug use screenings were reviewed  No concerns regarding substance use disorder identified  Other Counseling Topics:   Car/seat belt/driving safety and calcium and vitamin D intake and regular weightbearing exercise  No exam data present     Physical Exam:     /78   Pulse 63   Temp 98 8 °F (37 1 °C)   Ht 5' 5" (1 651 m)   Wt 82 6 kg (182 lb)   SpO2 98%   BMI 30 29 kg/m²     Physical Exam  Vitals and nursing note reviewed  Constitutional:       General: She is not in acute distress  Appearance: Normal appearance  She is not ill-appearing  Cardiovascular:      Rate and Rhythm: Normal rate and regular rhythm  Heart sounds: Normal heart sounds  No murmur heard  Pulmonary:      Effort: Pulmonary effort is normal  No respiratory distress  Breath sounds: Normal breath sounds  No decreased air movement  No wheezing  Abdominal:      Palpations: Abdomen is soft  Skin:     General: Skin is warm  Findings: No erythema or rash  Neurological:      General: No focal deficit present  Mental Status: She is alert  Psychiatric:         Mood and Affect: Mood normal          Behavior: Behavior normal  Behavior is cooperative  Thought Content:  Thought content normal           LAWSON De Paz

## 2022-07-19 NOTE — TELEPHONE ENCOUNTER
Scheduled date of EGD(as of today): 08/24/2022   Physician performing EGD: Caesar  Location of EGD: [place]  Instructions reviewed with patient by: Giana   Clearances: no

## 2022-08-24 ENCOUNTER — HOSPITAL ENCOUNTER (OUTPATIENT)
Dept: GASTROENTEROLOGY | Facility: HOSPITAL | Age: 77
Setting detail: OUTPATIENT SURGERY
Discharge: HOME | End: 2022-08-24

## 2022-08-24 VITALS
BODY MASS INDEX: 28.82 KG/M2 | TEMPERATURE: 98 F | HEART RATE: 83 BPM | HEIGHT: 65 IN | SYSTOLIC BLOOD PRESSURE: 121 MMHG | WEIGHT: 173 LBS | RESPIRATION RATE: 18 BRPM | OXYGEN SATURATION: 97 % | DIASTOLIC BLOOD PRESSURE: 73 MMHG

## 2022-08-24 DIAGNOSIS — K21.9 GASTROESOPHAGEAL REFLUX DISEASE, UNSPECIFIED WHETHER ESOPHAGITIS PRESENT: ICD-10-CM

## 2022-08-24 NOTE — H&P
History and Physical -  [facility]  Reese Em 69 y o  female MRN: [id number]                  HPI: Reese Em is a 69y o  year old female who presents for EGD for evaluation of GERD/chronic cough    **Procedure canceled due to patient testing positive for COVID 12 days ago      REVIEW OF SYSTEMS: Per the HPI, and otherwise unremarkable  Historical Information   Past Medical History:   Diagnosis Date    Constipation     Rheumatic fever     as child    Rosacea     Seasonal allergies      Past Surgical History:   Procedure Laterality Date    TONSILLECTOMY      as child    TUBAL LIGATION      WISDOM TOOTH EXTRACTION       Social History   Social History     Substance and Sexual Activity   Alcohol Use Yes     Social History     Substance and Sexual Activity   Drug Use Never     Social History     Tobacco Use   Smoking Status Never Smoker   Smokeless Tobacco Never Used     Family History   Problem Relation Age of Onset    Lung cancer Father     Lung cancer Sister     Breast cancer Sister        Meds/Allergies       Current Outpatient Medications:     fluticasone (FLONASE) 50 mcg/act nasal spray    Ketotifen Fumarate (ALAWAY OP)    loratadine (CLARITIN) 10 mg tablet    Nutritional Supplements (JUICE PLUS FIBRE PO)    pantoprazole (PROTONIX) 40 mg tablet    azelastine (ASTELIN) 0 1 % nasal spray    Probiotic Product (PROBIOTIC PO)    No Known Allergies    Objective     /73   Pulse 83   Temp 98 °F (36 7 °C) (Temporal)   Resp 18   Ht 5' 5" (1 651 m)   Wt 78 5 kg (173 lb)   SpO2 97%   BMI 28 79 kg/m²       PHYSICAL EXAM    Gen: NAD  Head: NCAT  CV: RRR  CHEST: Clear  ABD: soft, NT/ND  EXT: no edema      ASSESSMENT/PLAN:  This is a 69y o  year old female here for EGD, and she is stable and optimized for her procedure

## 2022-08-31 ENCOUNTER — TELEPHONE (OUTPATIENT)
Dept: GASTROENTEROLOGY | Facility: CLINIC | Age: 77
End: 2022-08-31

## 2022-08-31 NOTE — TELEPHONE ENCOUNTER
----- Message from Donato Elizabeth DO sent at 8/24/2022 10:02 AM EDT -----  Patient came for EGD today but tested positive for COVID 12 days ago, procedure canceled  Please reschedule with me at [facility] in roughly 4 weeks

## 2023-07-25 ENCOUNTER — OFFICE VISIT (OUTPATIENT)
Dept: FAMILY MEDICINE CLINIC | Facility: CLINIC | Age: 78
End: 2023-07-25
Payer: MEDICARE

## 2023-07-25 VITALS
SYSTOLIC BLOOD PRESSURE: 128 MMHG | BODY MASS INDEX: 28.16 KG/M2 | TEMPERATURE: 97.4 F | DIASTOLIC BLOOD PRESSURE: 78 MMHG | OXYGEN SATURATION: 98 % | WEIGHT: 169 LBS | HEIGHT: 65 IN | RESPIRATION RATE: 18 BRPM | HEART RATE: 77 BPM

## 2023-07-25 DIAGNOSIS — Z00.00 ENCOUNTER FOR MEDICARE ANNUAL WELLNESS EXAM: Primary | ICD-10-CM

## 2023-07-25 DIAGNOSIS — Z78.0 POST-MENOPAUSAL: ICD-10-CM

## 2023-07-25 DIAGNOSIS — K21.9 GASTROESOPHAGEAL REFLUX DISEASE, UNSPECIFIED WHETHER ESOPHAGITIS PRESENT: ICD-10-CM

## 2023-07-25 DIAGNOSIS — E78.2 MIXED HYPERLIPIDEMIA: ICD-10-CM

## 2023-07-25 PROCEDURE — G0439 PPPS, SUBSEQ VISIT: HCPCS | Performed by: NURSE PRACTITIONER

## 2023-07-25 NOTE — PATIENT INSTRUCTIONS
Medicare Preventive Visit Patient Instructions  Thank you for completing your Welcome to Medicare Visit or Medicare Annual Wellness Visit today. Your next wellness visit will be due in one year (7/25/2024). The screening/preventive services that you may require over the next 5-10 years are detailed below. Some tests may not apply to you based off risk factors and/or age. Screening tests ordered at today's visit but not completed yet may show as past due. Also, please note that scanned in results may not display below. Preventive Screenings:  Service Recommendations Previous Testing/Comments   Colorectal Cancer Screening  * Colonoscopy    * Fecal Occult Blood Test (FOBT)/Fecal Immunochemical Test (FIT)  * Fecal DNA/Cologuard Test  * Flexible Sigmoidoscopy Age: 43-73 years old   Colonoscopy: every 10 years (may be performed more frequently if at higher risk)  OR  FOBT/FIT: every 1 year  OR  Cologuard: every 3 years  OR  Sigmoidoscopy: every 5 years  Screening may be recommended earlier than age 39 if at higher risk for colorectal cancer. Also, an individualized decision between you and your healthcare provider will decide whether screening between the ages of 77-80 would be appropriate. Colonoscopy: 08/28/2019  FOBT/FIT: Not on file  Cologuard: Not on file  Sigmoidoscopy: Not on file          Breast Cancer Screening Age: 36 years old  Frequency: every 1-2 years  Not required if history of left and right mastectomy Mammogram: 05/02/2019        Cervical Cancer Screening Between the ages of 21-29, pap smear recommended once every 3 years. Between the ages of 32-69, can perform pap smear with HPV co-testing every 5 years.    Recommendations may differ for women with a history of total hysterectomy, cervical cancer, or abnormal pap smears in past. Pap Smear: Not on file    Screening Not Indicated   Hepatitis C Screening Once for adults born between [address] [place] [address]  More frequently in patients at high risk for Hepatitis C Hep C Antibody: 08/22/2019    Screening Current   Diabetes Screening 1-2 times per year if you're at risk for diabetes or have pre-diabetes Fasting glucose: 85 mg/dL (9/9/2020)  A1C: No results in last 5 years (No results in last 5 years)      Cholesterol Screening Once every 5 years if you don't have a lipid disorder. May order more often based on risk factors. Lipid panel: 09/09/2020    Screening Not Indicated  History Lipid Disorder     Other Preventive Screenings Covered by Medicare:  1. Abdominal Aortic Aneurysm (AAA) Screening: covered once if your at risk. You're considered to be at risk if you have a family history of AAA. 2. Lung Cancer Screening: covers low dose CT scan once per year if you meet all of the following conditions: (1) Age 48-67; (2) No signs or symptoms of lung cancer; (3) Current smoker or have quit smoking within the last 15 years; (4) You have a tobacco smoking history of at least 20 pack years (packs per day multiplied by number of years you smoked); (5) You get a written order from a healthcare provider. 3. Glaucoma Screening: covered annually if you're considered high risk: (1) You have diabetes OR (2) Family history of glaucoma OR (3)  aged 48 and older OR (3)  American aged 72 and older  3. Osteoporosis Screening: covered every 2 years if you meet one of the following conditions: (1) You're estrogen deficient and at risk for osteoporosis based off medical history and other findings; (2) Have a vertebral abnormality; (3) On glucocorticoid therapy for more than 3 months; (4) Have primary hyperparathyroidism; (5) On osteoporosis medications and need to assess response to drug therapy. · Last bone density test (DXA Scan): Not on file. 5. HIV Screening: covered annually if you're between the age of 14-79. Also covered annually if you are younger than 13 and older than 72 with risk factors for HIV infection.  For pregnant patients, it is covered up to 3 times per pregnancy. Immunizations:  Immunization Recommendations   Influenza Vaccine Annual influenza vaccination during flu season is recommended for all persons aged >= 6 months who do not have contraindications   Pneumococcal Vaccine   * Pneumococcal conjugate vaccine = PCV13 (Prevnar 13), PCV15 (Vaxneuvance), PCV20 (Prevnar 20)  * Pneumococcal polysaccharide vaccine = PPSV23 (Pneumovax) Adults 20-63 years old: 1-3 doses may be recommended based on certain risk factors  Adults 72 years old: 1-2 doses may be recommended based off what pneumonia vaccine you previously received   Hepatitis B Vaccine 3 dose series if at intermediate or high risk (ex: diabetes, end stage renal disease, liver disease)   Tetanus (Td) Vaccine - COST NOT COVERED BY MEDICARE PART B Following completion of primary series, a booster dose should be given every 10 years to maintain immunity against tetanus. Td may also be given as tetanus wound prophylaxis. Tdap Vaccine - COST NOT COVERED BY MEDICARE PART B Recommended at least once for all adults. For pregnant patients, recommended with each pregnancy. Shingles Vaccine (Shingrix) - COST NOT COVERED BY MEDICARE PART B  2 shot series recommended in those aged 48 and above     Health Maintenance Due:      Topic Date Due   • Breast Cancer Screening: Mammogram  05/02/2020   • Colorectal Cancer Screening  08/28/2022   • Hepatitis C Screening  Completed     Immunizations Due:      Topic Date Due   • Pneumococcal Vaccine: 65+ Years (2 - PCV) 06/26/2020   • COVID-19 Vaccine (3 - Moderna series) 04/27/2021   • Influenza Vaccine (1) 09/01/2023     Advance Directives   What are advance directives? Advance directives are legal documents that state your wishes and plans for medical care. These plans are made ahead of time in case you lose your ability to make decisions for yourself. Advance directives can apply to any medical decision, such as the treatments you want, and if you want to donate organs. What are the types of advance directives? There are many types of advance directives, and each state has rules about how to use them. You may choose a combination of any of the following:  · Living will: This is a written record of the treatment you want. You can also choose which treatments you do not want, which to limit, and which to stop at a certain time. This includes surgery, medicine, IV fluid, and tube feedings. · Durable power of  for healthcare [facility]): This is a written record that states who you want to make healthcare choices for you when you are unable to make them for yourself. This person, called a proxy, is usually a family member or a friend. You may choose more than 1 proxy. · Do not resuscitate (DNR) order:  A DNR order is used in case your heart stops beating or you stop breathing. It is a request not to have certain forms of treatment, such as CPR. A DNR order may be included in other types of advance directives. · Medical directive: This covers the care that you want if you are in a coma, near death, or unable to make decisions for yourself. You can list the treatments you want for each condition. Treatment may include pain medicine, surgery, blood transfusions, dialysis, IV or tube feedings, and a ventilator (breathing machine). · Values history: This document has questions about your views, beliefs, and how you feel and think about life. This information can help others choose the care that you would choose. Why are advance directives important? An advance directive helps you control your care. Although spoken wishes may be used, it is better to have your wishes written down. Spoken wishes can be misunderstood, or not followed. Treatments may be given even if you do not want them. An advance directive may make it easier for your family to make difficult choices about your care.    Urinary Incontinence   Urinary incontinence (UI)  is when you lose control of your bladder. UI develops because your bladder cannot store or empty urine properly. The 3 most common types of UI are stress incontinence, urge incontinence, or both. Medicines:   · May be given to help strengthen your bladder control. Report any side effects of medication to your healthcare provider. Do pelvic muscle exercises often:  Your pelvic muscles help you stop urinating. Squeeze these muscles tight for 5 seconds, then relax for 5 seconds. Gradually work up to squeezing for 10 seconds. Do 3 sets of 15 repetitions a day, or as directed. This will help strengthen your pelvic muscles and improve bladder control. Train your bladder:  Go to the bathroom at set times, such as every 2 hours, even if you do not feel the urge to go. You can also try to hold your urine when you feel the urge to go. For example, hold your urine for 5 minutes when you feel the urge to go. As that becomes easier, hold your urine for 10 minutes. Self-care:   · Keep a UI record. Write down how often you leak urine and how much you leak. Make a note of what you were doing when you leaked urine. · Drink liquids as directed. You may need to limit the amount of liquid you drink to help control your urine leakage. Do not drink any liquid right before you go to bed. Limit or do not have drinks that contain caffeine or alcohol. · Prevent constipation. Eat a variety of high-fiber foods. Good examples are high-fiber cereals, beans, vegetables, and whole-grain breads. Walking is the best way to trigger your intestines to have a bowel movement. · Exercise regularly and maintain a healthy weight. Weight loss and exercise will decrease pressure on your bladder and help you control your leakage. · Use a catheter as directed  to help empty your bladder. A catheter is a tiny, plastic tube that is put into your bladder to drain your urine. · Go to behavior therapy as directed.   Behavior therapy may be used to help you learn to control your urge to urinate. Weight Management   Why it is important to manage your weight:  Being overweight increases your risk of health conditions such as heart disease, high blood pressure, type 2 diabetes, and certain types of cancer. It can also increase your risk for osteoarthritis, sleep apnea, and other respiratory problems. Aim for a slow, steady weight loss. Even a small amount of weight loss can lower your risk of health problems. How to lose weight safely:  A safe and healthy way to lose weight is to eat fewer calories and get regular exercise. You can lose up about 1 pound a week by decreasing the number of calories you eat by 500 calories each day. Healthy meal plan for weight management:  A healthy meal plan includes a variety of foods, contains fewer calories, and helps you stay healthy. A healthy meal plan includes the following:  · Eat whole-grain foods more often. A healthy meal plan should contain fiber. Fiber is the part of grains, fruits, and vegetables that is not broken down by your body. Whole-grain foods are healthy and provide extra fiber in your diet. Some examples of whole-grain foods are whole-wheat breads and pastas, oatmeal, brown rice, and bulgur. · Eat a variety of vegetables every day. Include dark, leafy greens such as spinach, kale, vasile greens, and mustard greens. Eat yellow and orange vegetables such as carrots, sweet potatoes, and winter squash. · Eat a variety of fruits every day. Choose fresh or canned fruit (canned in its own juice or light syrup) instead of juice. Fruit juice has very little or no fiber. · Eat low-fat dairy foods. Drink fat-free (skim) milk or 1% milk. Eat fat-free yogurt and low-fat cottage cheese. Try low-fat cheeses such as mozzarella and other reduced-fat cheeses. · Choose meat and other protein foods that are low in fat. Choose beans or other legumes such as split peas or lentils.  Choose fish, skinless poultry (chicken or turkey), or lean cuts of red meat (beef or pork). Before you cook meat or poultry, cut off any visible fat. · Use less fat and oil. Try baking foods instead of frying them. Add less fat, such as margarine, sour cream, regular salad dressing and mayonnaise to foods. Eat fewer high-fat foods. Some examples of high-fat foods include french fries, doughnuts, ice cream, and cakes. · Eat fewer sweets. Limit foods and drinks that are high in sugar. This includes candy, cookies, regular soda, and sweetened drinks. Exercise:  Exercise at least 30 minutes per day on most days of the week. Some examples of exercise include walking, biking, dancing, and swimming. You can also fit in more physical activity by taking the stairs instead of the elevator or parking farther away from stores. Ask your healthcare provider about the best exercise plan for you. © Copyright [employer] 2018 Information is for End User's use only and may not be sold, redistributed or otherwise used for commercial purposes.  All illustrations and images included in CareNotes® are the copyrighted property of [employer] or [address] [place] [address]

## 2023-07-25 NOTE — PROGRESS NOTES
Assessment and Plan:     Problem List Items Addressed This Visit        Other    Mixed hyperlipidemia    Relevant Orders    Lipid panel    Comprehensive metabolic panel   Other Visit Diagnoses     Encounter for Medicare annual wellness exam    -  Primary    Relevant Orders    Lipid panel    Comprehensive metabolic panel    CBC and differential    DXA bone density spine hip and pelvis    Post-menopausal        Relevant Orders    DXA bone density spine hip and pelvis    Gastroesophageal reflux disease, unspecified whether esophagitis present        Relevant Orders    CBC and differential      1. Encounter for Medicare annual wellness exam    - Lipid panel; Future  - Comprehensive metabolic panel; Future  - CBC and differential; Future  - DXA bone density spine hip and pelvis; Future    2. Post-menopausal    - DXA bone density spine hip and pelvis; Future    3. Mixed hyperlipidemia    - Lipid panel; Future  - Comprehensive metabolic panel; Future    4. Gastroesophageal reflux disease, unspecified whether esophagitis present    - CBC and differential; Future    BMI Counseling: Body mass index is 28.12 kg/m². The BMI is above normal. Nutrition recommendations include encouraging healthy choices of fruits and vegetables, consuming healthier snacks, limiting drinks that contain sugar, moderation in carbohydrate intake, reducing intake of saturated and trans fat and reducing intake of cholesterol. Exercise recommendations include exercising 3-5 times per week. Rationale for BMI follow-up plan is due to patient being overweight or obese. Preventive health issues were discussed with patient, and age appropriate screening tests were ordered as noted in patient's After Visit Summary. Personalized health advice and appropriate referrals for health education or preventive services given if needed, as noted in patient's After Visit Summary.      History of Present Illness:     Patient presents for a [employer] Visit    Patient Care Team:  Brittany Mott as PCP - General (Family Medicine)     Review of Systems:     Review of Systems   Constitutional: Negative. Negative for activity change, appetite change, chills, fatigue and fever. HENT: Negative for congestion, ear pain, nosebleeds, rhinorrhea and sore throat. Eyes: Negative for photophobia, pain, redness and visual disturbance. Respiratory: Negative. Negative for cough, shortness of breath and wheezing. Cardiovascular: Negative. Negative for chest pain. Gastrointestinal: Negative. Negative for abdominal pain, constipation, diarrhea and vomiting. Endocrine: Negative. Genitourinary: Negative for difficulty urinating, dysuria and flank pain. Musculoskeletal: Negative. Skin: Negative for color change and rash. Neurological: Negative. Negative for dizziness, numbness and headaches. Hematological: Negative for adenopathy. Psychiatric/Behavioral: Negative. Negative for sleep disturbance. The patient is not nervous/anxious.          Problem List:     Patient Active Problem List   Diagnosis   • Medicare annual wellness visit, initial   • Seasonal allergies   • Diverticulosis   • Mixed hyperlipidemia      Past Medical and Surgical History:     Past Medical History:   Diagnosis Date   • Constipation    • Rheumatic fever     as child   • Rosacea    • Seasonal allergies      Past Surgical History:   Procedure Laterality Date   • TONSILLECTOMY      as child   • TUBAL LIGATION     • WISDOM TOOTH EXTRACTION        Family History:     Family History   Problem Relation Age of Onset   • Lung cancer Father    • Lung cancer Sister    • Breast cancer Sister       Social History:     Social History     Socioeconomic History   • Marital status: /Civil Union     Spouse name: None   • Number of children: None   • Years of education: None   • Highest education level: None   Occupational History   • None   Tobacco Use   • Smoking status: Never   • Smokeless tobacco: Never   Vaping Use   • Vaping Use: Never used   Substance and Sexual Activity   • Alcohol use: Yes   • Drug use: Never   • Sexual activity: None   Other Topics Concern   • None   Social History Narrative   • None     Social Determinants of Health     Financial Resource Strain: Low Risk  (7/25/2023)    Overall Financial Resource Strain (CARDIA)    • Difficulty of Paying Living Expenses: Not hard at all   Food Insecurity: Not on file   Transportation Needs: No Transportation Needs (7/25/2023)    PRAPARE - Transportation    • Lack of Transportation (Medical): No    • Lack of Transportation (Non-Medical): No   Physical Activity: Not on file   Stress: Not on file   Social Connections: Not on file   Intimate Partner Violence: Not on file   Housing Stability: Not on file      Medications and Allergies:     Current Outpatient Medications   Medication Sig Dispense Refill   • azelastine (ASTELIN) 0.1 % nasal spray 1 spray into each nostril 2 (two) times a day Use in each nostril as directed 30 mL 11   • fluticasone (FLONASE) 50 mcg/act nasal spray 1 spray into each nostril daily     • Ketotifen Fumarate (ALAWAY OP) Apply 1 drop to eye every morning     • loratadine (CLARITIN) 10 mg tablet Take 10 mg by mouth every morning     • Nutritional Supplements (JUICE PLUS FIBRE PO) Take 1 capsule by mouth every morning     • pantoprazole (PROTONIX) 40 mg tablet Take 1 tablet (40 mg total) by mouth daily 30 tablet 5   • Probiotic Product (PROBIOTIC PO) Take by mouth (Patient not taking: Reported on 7/19/2022)       No current facility-administered medications for this visit.      No Known Allergies   Immunizations:     Immunization History   Administered Date(s) Administered   • COVID-19 MODERNA VACC 0.5 ML IM 02/03/2021, 03/02/2021   • Pneumococcal Polysaccharide PPV23 10/10/2011, 06/26/2019      Health Maintenance:         Topic Date Due   • Breast Cancer Screening: Mammogram  05/02/2020   • Colorectal Cancer Screening  08/28/2022   • Hepatitis C Screening  Completed         Topic Date Due   • Pneumococcal Vaccine: 65+ Years (2 - PCV) 06/26/2020   • COVID-19 Vaccine (3 - Moderna series) 04/27/2021   • Influenza Vaccine (1) 09/01/2023      Medicare Screening Tests and Risk Assessments:     Froy Hernandez is here for her Subsequent Wellness visit. Last Medicare Wellness visit information reviewed, patient interviewed, no change since last AWV. Health Risk Assessment:   Patient rates overall health as good. Patient feels that their physical health rating is same. Patient is satisfied with their life. Eyesight was rated as same. Hearing was rated as same. Patient feels that their emotional and mental health rating is same. Patients states they are never, rarely angry. Patient states they are sometimes unusually tired/fatigued. Pain experienced in the last 7 days has been some. Patient's pain rating has been 2/10. Patient states that she has experienced no weight loss or gain in last 6 months. Depression Screening:   PHQ-2 Score: 0      Fall Risk Screening: In the past year, patient has experienced: no history of falling in past year      Urinary Incontinence Screening:   Patient has leaked urine accidently in the last six months. urge    Home Safety:  Patient does not have trouble with stairs inside or outside of their home. Patient has working smoke alarms Home safety hazards include: none. Nutrition:   Current diet is Regular. Medications:   Patient is currently taking over-the-counter supplements. OTC medications include: see medication list. Patient is able to manage medications. Activities of Daily Living (ADLs)/Instrumental Activities of Daily Living (IADLs):   Walk and transfer into and out of bed and chair?: Yes  Dress and groom yourself?: Yes    Bathe or shower yourself?: Yes    Feed yourself?  Yes  Do your laundry/housekeeping?: Yes  Manage your money, pay your bills and track your expenses?: Yes  Make your own meals?: Yes    Do your own shopping?: Yes    Previous Hospitalizations:   Any hospitalizations or ED visits within the last 12 months?: No      Advance Care Planning:   Living will: Yes    Durable POA for healthcare: Yes    Advanced directive: Yes    Five wishes given: No      Cognitive Screening:   Provider or family/friend/caregiver concerned regarding cognition?: No    PREVENTIVE SCREENINGS      Cardiovascular Screening:    General: Screening Not Indicated, History Lipid Disorder and Risks and Benefits Discussed    Due for: Lipid Panel      Diabetes Screening:     General: Risks and Benefits Discussed    Due for: Blood Glucose      Colorectal Cancer Screening:     General: Screening Not Indicated      Breast Cancer Screening:     General: Screening Not Indicated      Cervical Cancer Screening:    General: Screening Not Indicated      Osteoporosis Screening:    General: Risks and Benefits Discussed    Due for: Bone Density Ultrasound      Abdominal Aortic Aneurysm (AAA) Screening:        General: Screening Not Indicated      Lung Cancer Screening:     General: Screening Not Indicated      Hepatitis C Screening:    General: Screening Current    Screening, Brief Intervention, and Referral to Treatment (SBIRT)    Screening  Typical number of drinks in a day: 0  Typical number of drinks in a week: 0  Interpretation: Low risk drinking behavior. Single Item Drug Screening:  How often have you used an illegal drug (including marijuana) or a prescription medication for non-medical reasons in the past year? never    Single Item Drug Screen Score: 0  Interpretation: Negative screen for possible drug use disorder    Brief Intervention  Alcohol & drug use screenings were reviewed. No concerns regarding substance use disorder identified. Other Counseling Topics:   Car/seat belt/driving safety and regular weightbearing exercise and calcium and vitamin D intake. No results found.      Physical Exam: /78   Pulse 77   Temp (!) 97.4 °F (36.3 °C)   Resp 18   Ht 5' 5" (1.651 m)   Wt 76.7 kg (169 lb)   SpO2 98%   BMI 28.12 kg/m²     Physical Exam  Vitals and nursing note reviewed. Constitutional:       General: She is not in acute distress. Appearance: Normal appearance. She is well-developed. She is not ill-appearing or diaphoretic. HENT:      Head: Normocephalic and atraumatic. Right Ear: Hearing, tympanic membrane and ear canal normal.      Left Ear: Hearing, tympanic membrane and ear canal normal.      Nose: Nose normal. No congestion. Mouth/Throat:      Mouth: Mucous membranes are moist.      Pharynx: Uvula midline. No oropharyngeal exudate. Eyes:      General: Lids are normal.      Conjunctiva/sclera: Conjunctivae normal.   Cardiovascular:      Rate and Rhythm: Normal rate and regular rhythm. Heart sounds: Normal heart sounds, S1 normal and S2 normal. No murmur heard. Pulmonary:      Effort: Pulmonary effort is normal. No respiratory distress. Breath sounds: Normal breath sounds. Musculoskeletal:         General: No tenderness. Normal range of motion. Lymphadenopathy:      Cervical: No cervical adenopathy. Skin:     General: Skin is warm. Capillary Refill: Capillary refill takes less than 2 seconds. Findings: No rash. Neurological:      General: No focal deficit present. Mental Status: She is alert. Psychiatric:         Behavior: Behavior normal. Behavior is cooperative. Thought Content:  Thought content normal.         Judgment: Judgment normal.          LAWSON Tay

## 2024-02-21 PROBLEM — Z00.00 MEDICARE ANNUAL WELLNESS VISIT, INITIAL: Status: RESOLVED | Noted: 2019-06-26 | Resolved: 2024-02-21

## 2024-07-30 ENCOUNTER — OFFICE VISIT (OUTPATIENT)
Dept: FAMILY MEDICINE CLINIC | Facility: CLINIC | Age: 79
End: 2024-07-30
Payer: MEDICARE

## 2024-07-30 VITALS
TEMPERATURE: 98.1 F | OXYGEN SATURATION: 98 % | DIASTOLIC BLOOD PRESSURE: 84 MMHG | WEIGHT: 170 LBS | SYSTOLIC BLOOD PRESSURE: 130 MMHG | HEIGHT: 65 IN | HEART RATE: 95 BPM | BODY MASS INDEX: 28.32 KG/M2 | RESPIRATION RATE: 18 BRPM

## 2024-07-30 DIAGNOSIS — R05.3 CHRONIC COUGH: ICD-10-CM

## 2024-07-30 DIAGNOSIS — K21.9 GASTROESOPHAGEAL REFLUX DISEASE, UNSPECIFIED WHETHER ESOPHAGITIS PRESENT: ICD-10-CM

## 2024-07-30 DIAGNOSIS — N81.4 PROLAPSED UTERUS: ICD-10-CM

## 2024-07-30 DIAGNOSIS — Z78.0 POSTMENOPAUSAL: ICD-10-CM

## 2024-07-30 DIAGNOSIS — Z13.820 SCREENING FOR OSTEOPOROSIS: ICD-10-CM

## 2024-07-30 DIAGNOSIS — Z00.00 ENCOUNTER FOR MEDICARE ANNUAL WELLNESS EXAM: Primary | ICD-10-CM

## 2024-07-30 PROCEDURE — G0439 PPPS, SUBSEQ VISIT: HCPCS | Performed by: NURSE PRACTITIONER

## 2024-07-30 RX ORDER — PANTOPRAZOLE SODIUM 40 MG/1
40 TABLET, DELAYED RELEASE ORAL DAILY
Qty: 30 TABLET | Refills: 5 | Status: SHIPPED | OUTPATIENT
Start: 2024-07-30

## 2024-07-30 NOTE — PROGRESS NOTES
Ambulatory Visit  Name: Gabbie Angeles      : 1945      MRN: [id number]  Encounter Provider: LAWSON Henderson  Encounter Date: 2024   Encounter department: [facility] PRIMARY CARE    Assessment & Plan   1. Encounter for Medicare annual wellness exam  -     Lipid panel; Future  -     CBC and differential; Future  -     Comprehensive metabolic panel; Future  2. Screening for osteoporosis  -     DXA bone density spine hip and pelvis; Future; Expected date: 2024  3. Postmenopausal  -     DXA bone density spine hip and pelvis; Future; Expected date: 2024  4. Chronic cough  -     pantoprazole (PROTONIX) 40 mg tablet; Take 1 tablet (40 mg total) by mouth daily  5. Gastroesophageal reflux disease, unspecified whether esophagitis present  -     pantoprazole (PROTONIX) 40 mg tablet; Take 1 tablet (40 mg total) by mouth daily  -     Ambulatory Referral to Gastroenterology; Future  -     CBC and differential; Future  -     Comprehensive metabolic panel; Future  6. Prolapsed uterus  -     Ambulatory Referral to Obstetrics / Gynecology; Future      Depression Screening and Follow-up Plan: Patient was screened for depression during today's encounter. They screened negative with a PHQ-2 score of 0.      Preventive health issues were discussed with patient, and age appropriate screening tests were ordered as noted in patient's After Visit Summary. Personalized health advice and appropriate referrals for health education or preventive services given if needed, as noted in patient's After Visit Summary.    History of Present Illness      Paitent here for wellness visit.     C/o gerd. For the past 2 years. Not taking anything right now.     Constipation- taking probiotic. And a stool softener or intermittent constipation.     Prolapsed uterus.    Heartburn  She complains of coughing.   Cough    Constipation       Patient Care Team:  LAWSON Henderson as PCP - General (Family  Medicine)    Review of Systems   Respiratory:  Positive for cough.    Gastrointestinal:  Positive for constipation.     Medical History Reviewed by provider this encounter:  Meds       Annual Wellness Visit Questionnaire       Health Risk Assessment:   Patient rates overall health as good. Patient feels that their physical health rating is same. Patient is very satisfied with their life. Eyesight was rated as same. Hearing was rated as same. Patient feels that their emotional and mental health rating is same. Patients states they are never, rarely angry. Patient states they are sometimes unusually tired/fatigued. Pain experienced in the last 7 days has been some. Patient's pain rating has been 2/10. Patient states that she has experienced no weight loss or gain in last 6 months.     Depression Screening:   PHQ-2 Score: 0      Fall Risk Screening:   In the past year, patient has experienced: no history of falling in past year      Urinary Incontinence Screening:   Patient has not leaked urine accidently in the last six months.     Home Safety:  Patient does not have trouble with stairs inside or outside of their home. Patient has no working smoke alarms and has no working carbon monoxide detector. Home safety hazards include: none.     Nutrition:   Current diet is Regular.     Medications:   Patient is currently taking over-the-counter supplements. OTC medications include: see medication list. Patient is able to manage medications.     Activities of Daily Living (ADLs)/Instrumental Activities of Daily Living (IADLs):   Walk and transfer into and out of bed and chair?: Yes  Dress and groom yourself?: Yes    Bathe or shower yourself?: Yes    Feed yourself? Yes  Do your laundry/housekeeping?: Yes  Manage your money, pay your bills and track your expenses?: Yes  Make your own meals?: Yes    Do your own shopping?: Yes    Previous Hospitalizations:   Any hospitalizations or ED visits within the last 12 months?: No       Advance Care Planning:   Living will: Yes    Durable POA for healthcare: Yes    Advanced directive: Yes      Cognitive Screening:   Provider or family/friend/caregiver concerned regarding cognition?: No    PREVENTIVE SCREENINGS      Cardiovascular Screening:    General: History Lipid Disorder and Risks and Benefits Discussed    Due for: Lipid Panel      Diabetes Screening:     General: Risks and Benefits Discussed    Due for: Blood Glucose      Colorectal Cancer Screening:     General: Risks and Benefits Discussed and Screening Not Indicated      Breast Cancer Screening:     General: Screening Not Indicated      Cervical Cancer Screening:    General: Screening Not Indicated      Osteoporosis Screening:    General: Screening Not Indicated      Lung Cancer Screening:     General: Screening Not Indicated      Hepatitis C Screening:    General: Screening Current    Screening, Brief Intervention, and Referral to Treatment (SBIRT)    Screening  Typical number of drinks in a day: 0  Typical number of drinks in a week: 0  Interpretation: Low risk drinking behavior.    Single Item Drug Screening:  How often have you used an illegal drug (including marijuana) or a prescription medication for non-medical reasons in the past year? never    Single Item Drug Screen Score: 0  Interpretation: Negative screen for possible drug use disorder    Brief Intervention  Alcohol & drug use screenings were reviewed. No concerns regarding substance use disorder identified.     Other Counseling Topics:   Car/seat belt/driving safety, skin self-exam, sunscreen and regular weightbearing exercise and calcium and vitamin D intake.     Social Determinants of Health     Financial Resource Strain: Low Risk  (7/25/2023)    Overall Financial Resource Strain (CARDIA)     Difficulty of Paying Living Expenses: Not hard at all   Food Insecurity: No Food Insecurity (7/30/2024)    Hunger Vital Sign     Worried About Running Out of Food in the Last Year:  "Never true     Ran Out of Food in the Last Year: Never true   Transportation Needs: No Transportation Needs (7/30/2024)    PRAPARE - Transportation     Lack of Transportation (Medical): No     Lack of Transportation (Non-Medical): No   Housing Stability: Low Risk  (7/30/2024)    Housing Stability Vital Sign     Unable to Pay for Housing in the Last Year: No     Number of Times Moved in the Last Year: 1     Homeless in the Last Year: No   Utilities: Not At Risk (7/30/2024)    [facility] Utilities     Threatened with loss of utilities: No     No results found.    Objective     /84   Pulse 95   Temp 98.1 °F (36.7 °C)   Resp 18   Ht 5' 5\" (1.651 m)   Wt 77.1 kg (170 lb)   SpO2 98%   BMI 28.29 kg/m²     Physical Exam  Constitutional:       General: She is not in acute distress.     Appearance: Normal appearance. She is well-developed. She is not ill-appearing.   HENT:      Head: Normocephalic and atraumatic.      Right Ear: Tympanic membrane, ear canal and external ear normal.      Left Ear: Tympanic membrane, ear canal and external ear normal.      Nose: Nose normal. No rhinorrhea.      Mouth/Throat:      Pharynx: Uvula midline.   Eyes:      General: Lids are normal.      Conjunctiva/sclera: Conjunctivae normal.      Pupils: Pupils are equal, round, and reactive to light.   Cardiovascular:      Rate and Rhythm: Normal rate and regular rhythm.      Heart sounds: Normal heart sounds, S1 normal and S2 normal. No murmur heard.     No friction rub. No gallop.   Pulmonary:      Effort: Pulmonary effort is normal. No respiratory distress.      Breath sounds: Normal breath sounds. No decreased breath sounds or wheezing.   Abdominal:      General: Bowel sounds are normal. There is no distension.      Palpations: Abdomen is soft. There is no mass.      Tenderness: There is no abdominal tenderness. There is no rebound.   Musculoskeletal:         General: No tenderness or deformity. Normal range of motion.   Skin:     " General: Skin is warm.      Findings: No erythema or rash.   Neurological:      Mental Status: She is alert and oriented to person, place, and time.      Comments: Gross Neuro intact.    Psychiatric:         Behavior: Behavior normal. Behavior is cooperative.         Thought Content: Thought content normal.

## 2024-07-30 NOTE — PATIENT INSTRUCTIONS
Call to schedule gastroenterology appointment.   Call to schedule GYN appointment.   Complete labs. Fasting for 8-10 hours prior. Drink a glass of water in the morning.   Call to schedule Dexa scan at the hospital.   Start protonix daily.     Medicare Preventive Visit Patient Instructions  Thank you for completing your Welcome to Medicare Visit or Medicare Annual Wellness Visit today. Your next wellness visit will be due in one year (7/31/2025).  The screening/preventive services that you may require over the next 5-10 years are detailed below. Some tests may not apply to you based off risk factors and/or age. Screening tests ordered at today's visit but not completed yet may show as past due. Also, please note that scanned in results may not display below.  Preventive Screenings:  Service Recommendations Previous Testing/Comments   Colorectal Cancer Screening  * Colonoscopy    * Fecal Occult Blood Test (FOBT)/Fecal Immunochemical Test (FIT)  * Fecal DNA/Cologuard Test  * Flexible Sigmoidoscopy Age: 45-75 years old   Colonoscopy: every 10 years (may be performed more frequently if at higher risk)  OR  FOBT/FIT: every 1 year  OR  Cologuard: every 3 years  OR  Sigmoidoscopy: every 5 years  Screening may be recommended earlier than age 45 if at higher risk for colorectal cancer. Also, an individualized decision between you and your healthcare provider will decide whether screening between the ages of 76-85 would be appropriate. Colonoscopy: 08/28/2019  FOBT/FIT: Not on file  Cologuard: Not on file  Sigmoidoscopy: Not on file          Breast Cancer Screening Age: 40+ years old  Frequency: every 1-2 years  Not required if history of left and right mastectomy Mammogram: 05/02/2019        Cervical Cancer Screening Between the ages of 21-29, pap smear recommended once every 3 years.   Between the ages of 30-65, can perform pap smear with HPV co-testing every 5 years.   Recommendations may differ for women with a history of  total hysterectomy, cervical cancer, or abnormal pap smears in past. Pap Smear: Not on file    Screening Not Indicated   Hepatitis C Screening Once for adults born between 1945 and 1965  More frequently in patients at high risk for Hepatitis C Hep C Antibody: 08/22/2019    Screening Current   Diabetes Screening 1-2 times per year if you're at risk for diabetes or have pre-diabetes Fasting glucose: 85 mg/dL (9/9/2020)  A1C: No results in last 5 years (No results in last 5 years)      Cholesterol Screening Once every 5 years if you don't have a lipid disorder. May order more often based on risk factors. Lipid panel: 09/09/2020    Screening Not Indicated  History Lipid Disorder     Other Preventive Screenings Covered by Medicare:  Abdominal Aortic Aneurysm (AAA) Screening: covered once if your at risk. You're considered to be at risk if you have a family history of AAA.  Lung Cancer Screening: covers low dose CT scan once per year if you meet all of the following conditions: (1) Age 55-77; (2) No signs or symptoms of lung cancer; (3) Current smoker or have quit smoking within the last 15 years; (4) You have a tobacco smoking history of at least 20 pack years (packs per day multiplied by number of years you smoked); (5) You get a written order from a healthcare provider.  Glaucoma Screening: covered annually if you're considered high risk: (1) You have diabetes OR (2) Family history of glaucoma OR (3)  aged 50 and older OR (4)  American aged 65 and older  Osteoporosis Screening: covered every 2 years if you meet one of the following conditions: (1) You're estrogen deficient and at risk for osteoporosis based off medical history and other findings; (2) Have a vertebral abnormality; (3) On glucocorticoid therapy for more than 3 months; (4) Have primary hyperparathyroidism; (5) On osteoporosis medications and need to assess response to drug therapy.   Last bone density test (DXA Scan): Not on  file.  HIV Screening: covered annually if you're between the age of 15-65. Also covered annually if you are younger than 15 and older than 65 with risk factors for HIV infection. For pregnant patients, it is covered up to 3 times per pregnancy.    Immunizations:  Immunization Recommendations   Influenza Vaccine Annual influenza vaccination during flu season is recommended for all persons aged >= 6 months who do not have contraindications   Pneumococcal Vaccine   * Pneumococcal conjugate vaccine = PCV13 (Prevnar 13), PCV15 (Vaxneuvance), PCV20 (Prevnar 20)  * Pneumococcal polysaccharide vaccine = PPSV23 (Pneumovax) Adults 19-65 yo with certain risk factors or if 65+ yo  If never received any pneumonia vaccine: recommend Prevnar 20 (PCV20)  Give PCV20 if previously received 1 dose of PCV13 or PPSV23   Hepatitis B Vaccine 3 dose series if at intermediate or high risk (ex: diabetes, end stage renal disease, liver disease)   Respiratory syncytial virus (RSV) Vaccine - COVERED BY MEDICARE PART D  * RSVPreF3 (Arexvy) CDC recommends that adults 60 years of age and older may receive a single dose of RSV vaccine using shared clinical decision-making (SCDM)   Tetanus (Td) Vaccine - COST NOT COVERED BY MEDICARE PART B Following completion of primary series, a booster dose should be given every 10 years to maintain immunity against tetanus. Td may also be given as tetanus wound prophylaxis.   Tdap Vaccine - COST NOT COVERED BY MEDICARE PART B Recommended at least once for all adults. For pregnant patients, recommended with each pregnancy.   Shingles Vaccine (Shingrix) - COST NOT COVERED BY MEDICARE PART B  2 shot series recommended in those 19 years and older who have or will have weakened immune systems or those 50 years and older     Health Maintenance Due:      Topic Date Due    Breast Cancer Screening: Mammogram  05/02/2020    Colorectal Cancer Screening  08/28/2022    Hepatitis C Screening  Completed     Immunizations  Due:      Topic Date Due    Pneumococcal Vaccine: 65+ Years (2 of 2 - PCV) 06/26/2020    COVID-19 Vaccine (3 - 2023-24 season) 09/01/2023    Influenza Vaccine (1) 09/01/2024     Advance Directives   What are advance directives?  Advance directives are legal documents that state your wishes and plans for medical care. These plans are made ahead of time in case you lose your ability to make decisions for yourself. Advance directives can apply to any medical decision, such as the treatments you want, and if you want to donate organs.   What are the types of advance directives?  There are many types of advance directives, and each state has rules about how to use them. You may choose a combination of any of the following:  Living will:  This is a written record of the treatment you want. You can also choose which treatments you do not want, which to limit, and which to stop at a certain time. This includes surgery, medicine, IV fluid, and tube feedings.   Durable power of  for healthcare (DPAHC):  This is a written record that states who you want to make healthcare choices for you when you are unable to make them for yourself. This person, called a proxy, is usually a family member or a friend. You may choose more than 1 proxy.  Do not resuscitate (DNR) order:  A DNR order is used in case your heart stops beating or you stop breathing. It is a request not to have certain forms of treatment, such as CPR. A DNR order may be included in other types of advance directives.  Medical directive:  This covers the care that you want if you are in a coma, near death, or unable to make decisions for yourself. You can list the treatments you want for each condition. Treatment may include pain medicine, surgery, blood transfusions, dialysis, IV or tube feedings, and a ventilator (breathing machine).  Values history:  This document has questions about your views, beliefs, and how you feel and think about life. This information  can help others choose the care that you would choose.  Why are advance directives important?  An advance directive helps you control your care. Although spoken wishes may be used, it is better to have your wishes written down. Spoken wishes can be misunderstood, or not followed. Treatments may be given even if you do not want them. An advance directive may make it easier for your family to make difficult choices about your care.   Weight Management   Why it is important to manage your weight:  Being overweight increases your risk of health conditions such as heart disease, high blood pressure, type 2 diabetes, and certain types of cancer. It can also increase your risk for osteoarthritis, sleep apnea, and other respiratory problems. Aim for a slow, steady weight loss. Even a small amount of weight loss can lower your risk of health problems.  How to lose weight safely:  A safe and healthy way to lose weight is to eat fewer calories and get regular exercise. You can lose up about 1 pound a week by decreasing the number of calories you eat by 500 calories each day.   Healthy meal plan for weight management:  A healthy meal plan includes a variety of foods, contains fewer calories, and helps you stay healthy. A healthy meal plan includes the following:  Eat whole-grain foods more often.  A healthy meal plan should contain fiber. Fiber is the part of grains, fruits, and vegetables that is not broken down by your body. Whole-grain foods are healthy and provide extra fiber in your diet. Some examples of whole-grain foods are whole-wheat breads and pastas, oatmeal, brown rice, and bulgur.  Eat a variety of vegetables every day.  Include dark, leafy greens such as spinach, kale, vasile greens, and mustard greens. Eat yellow and orange vegetables such as carrots, sweet potatoes, and winter squash.   Eat a variety of fruits every day.  Choose fresh or canned fruit (canned in its own juice or light syrup) instead of juice.  Fruit juice has very little or no fiber.  Eat low-fat dairy foods.  Drink fat-free (skim) milk or 1% milk. Eat fat-free yogurt and low-fat cottage cheese. Try low-fat cheeses such as mozzarella and other reduced-fat cheeses.  Choose meat and other protein foods that are low in fat.  Choose beans or other legumes such as split peas or lentils. Choose fish, skinless poultry (chicken or turkey), or lean cuts of red meat (beef or pork). Before you cook meat or poultry, cut off any visible fat.   Use less fat and oil.  Try baking foods instead of frying them. Add less fat, such as margarine, sour cream, regular salad dressing and mayonnaise to foods. Eat fewer high-fat foods. Some examples of high-fat foods include french fries, doughnuts, ice cream, and cakes.  Eat fewer sweets.  Limit foods and drinks that are high in sugar. This includes candy, cookies, regular soda, and sweetened drinks.  Exercise:  Exercise at least 30 minutes per day on most days of the week. Some examples of exercise include walking, biking, dancing, and swimming. You can also fit in more physical activity by taking the stairs instead of the elevator or parking farther away from stores. Ask your healthcare provider about the best exercise plan for you.      © Copyright [employer] 2018 Information is for End User's use only and may not be sold, redistributed or otherwise used for commercial purposes. All illustrations and images included in CareNotes® are the copyrighted property of A.D.A.M., Inc. or [employer]

## 2024-09-17 ENCOUNTER — HOSPITAL ENCOUNTER (OUTPATIENT)
Dept: BONE DENSITY | Facility: HOSPITAL | Age: 79
Discharge: HOME | End: 2024-09-17
Payer: COMMERCIAL

## 2024-09-17 VITALS — WEIGHT: 170 LBS | HEIGHT: 65 IN | BODY MASS INDEX: 28.32 KG/M2

## 2024-09-17 DIAGNOSIS — Z78.0 POSTMENOPAUSAL: ICD-10-CM

## 2024-09-17 DIAGNOSIS — Z13.820 SCREENING FOR OSTEOPOROSIS: ICD-10-CM

## 2024-09-17 PROCEDURE — 77080 DXA BONE DENSITY AXIAL: CPT

## 2024-09-23 DIAGNOSIS — K21.9 GASTROESOPHAGEAL REFLUX DISEASE, UNSPECIFIED WHETHER ESOPHAGITIS PRESENT: ICD-10-CM

## 2024-09-23 DIAGNOSIS — R05.3 CHRONIC COUGH: ICD-10-CM

## 2024-09-24 RX ORDER — PANTOPRAZOLE SODIUM 40 MG/1
40 TABLET, DELAYED RELEASE ORAL DAILY
Qty: 90 TABLET | Refills: 1 | Status: SHIPPED | OUTPATIENT
Start: 2024-09-24

## 2024-11-25 ENCOUNTER — OFFICE VISIT (OUTPATIENT)
Dept: OBGYN CLINIC | Facility: CLINIC | Age: 79
End: 2024-11-25
Payer: COMMERCIAL

## 2024-11-25 VITALS
HEIGHT: 65 IN | SYSTOLIC BLOOD PRESSURE: 124 MMHG | DIASTOLIC BLOOD PRESSURE: 70 MMHG | BODY MASS INDEX: 29.32 KG/M2 | WEIGHT: 176 LBS

## 2024-11-25 DIAGNOSIS — N81.4 PROLAPSED UTERUS: ICD-10-CM

## 2024-11-25 DIAGNOSIS — N81.11 PROLAPSE OF VAGINAL WALL WITH MIDLINE CYSTOCELE: ICD-10-CM

## 2024-11-25 PROBLEM — Z87.898 HISTORY OF CHRONIC COUGH: Status: ACTIVE | Noted: 2024-11-25

## 2024-11-25 PROCEDURE — 99203 OFFICE O/P NEW LOW 30 MIN: CPT | Performed by: ADVANCED PRACTICE MIDWIFE

## 2024-11-25 NOTE — PROGRESS NOTES
Name: Gabbie Angeles      : 1945      MRN: [id number]  Encounter Provider: Tabatha De Leon CNM  Encounter Date: 2024   Encounter department: [facility] [place]  :  Assessment & Plan  Prolapse of vaginal wall with midline cystocele    Orders:    Ambulatory Referral to Physical Therapy; Future  Reviewed treatment options   Discussed pessary and routine follow-up   Physical therapy options and pelvic floor exercises   Discussed surgical repair will consider, if no improvement with physical therapy  Prolapsed uterus    Orders:    Ambulatory Referral to Obstetrics / Gynecology        History of Present Illness     Gabbie is her with complaints of vaginal prolapse. States that she noticed about 4-5 months ago and in past month seems worse. She is still working, but notes that she sits at a desk during work day. Tries to remain active and walk regularly. She notes urinary urgency and it has increased in the past 2 months. No leaking of urine. Notes that she has occasional constipation. Take probiotic and fiber. Notes some delayed emptying of bladder. Has urgency in the middle of the night. Negative sexual activity. 3 vaginal deliveries and largest child was over 10 lbs.       Gabbie Angeles is a 79 y.o. female who presents vaginal prolapse      Review of Systems   Constitutional:  Negative for chills and fever.   Respiratory:  Positive for cough. Negative for shortness of breath.         Notes chronic cough   Cardiovascular:  Negative for chest pain and palpitations.   Gastrointestinal:  Positive for constipation. Negative for diarrhea.   Genitourinary:  Positive for urgency. Negative for difficulty urinating, dysuria, frequency, pelvic pain, vaginal bleeding, vaginal discharge and vaginal pain.        Is emptying bladder at 3 am and 5 am     Medical History Reviewed by provider this encounter:     .  Current Outpatient Medications on File Prior to Visit   Medication Sig Dispense  "Refill    fluticasone (FLONASE) 50 mcg/act nasal spray 1 spray into each nostril daily      Ketotifen Fumarate (ALAWAY OP) Apply 1 drop to eye every morning      loratadine (CLARITIN) 10 mg tablet Take 10 mg by mouth every morning      Nutritional Supplements (JUICE PLUS FIBRE PO) Take 1 capsule by mouth every morning      pantoprazole (PROTONIX) 40 mg tablet take 1 tablet by mouth once daily 90 tablet 1    Probiotic Product (PROBIOTIC PO) Take by mouth      azelastine (ASTELIN) 0.1 % nasal spray 1 spray into each nostril 2 (two) times a day Use in each nostril as directed (Patient not taking: Reported on 11/25/2024) 30 mL 11     No current facility-administered medications on file prior to visit.      Social History     Tobacco Use    Smoking status: Never    Smokeless tobacco: Never   Vaping Use    Vaping status: Never Used   Substance and Sexual Activity    Alcohol use: Not Currently    Drug use: Never    Sexual activity: Not Currently     Partners: Male     Birth control/protection: Post-menopausal, Female Sterilization        Objective   /70   Ht 5' 5\" (1.651 m)   Wt 79.8 kg (176 lb)   BMI 29.29 kg/m²      Physical Exam  Constitutional:       Appearance: Normal appearance.   Cardiovascular:      Rate and Rhythm: Normal rate.   Pulmonary:      Effort: Pulmonary effort is normal.   Genitourinary:     General: Normal vulva.      Exam position: Lithotomy position.      Labia:         Right: No rash, tenderness or lesion.         Left: No rash, tenderness or lesion.       Urethra: No urethral pain, urethral swelling or urethral lesion.      Vagina: Prolapsed vaginal walls present. No vaginal discharge, erythema, tenderness or lesions.      Cervix: No cervical motion tenderness, discharge, friability, lesion, erythema or cervical bleeding.      Uterus: With uterine prolapse. Not enlarged and not tender.       Adnexa:         Right: No mass, tenderness or fullness.          Left: No mass, tenderness or " fullness.        Comments: Moderate prolapse midline cystocele, increased noted with Valsalva.  Stage II uterine prolapse  Neurological:      Mental Status: She is alert and oriented to person, place, and time.   Psychiatric:         Mood and Affect: Mood normal.         Behavior: Behavior normal.

## 2024-11-26 ENCOUNTER — OFFICE VISIT (OUTPATIENT)
Dept: GASTROENTEROLOGY | Facility: CLINIC | Age: 79
End: 2024-11-26
Payer: COMMERCIAL

## 2024-11-26 VITALS
BODY MASS INDEX: 29.16 KG/M2 | HEART RATE: 91 BPM | TEMPERATURE: 97.7 F | WEIGHT: 175 LBS | SYSTOLIC BLOOD PRESSURE: 130 MMHG | DIASTOLIC BLOOD PRESSURE: 82 MMHG | OXYGEN SATURATION: 97 % | HEIGHT: 65 IN

## 2024-11-26 DIAGNOSIS — Z86.0100 PERSONAL HISTORY OF COLON POLYPS, UNSPECIFIED: ICD-10-CM

## 2024-11-26 DIAGNOSIS — R05.3 CHRONIC COUGH: ICD-10-CM

## 2024-11-26 DIAGNOSIS — K59.09 INTERMITTENT CONSTIPATION: ICD-10-CM

## 2024-11-26 DIAGNOSIS — K21.9 GASTROESOPHAGEAL REFLUX DISEASE, UNSPECIFIED WHETHER ESOPHAGITIS PRESENT: Primary | ICD-10-CM

## 2024-11-26 PROCEDURE — 99214 OFFICE O/P EST MOD 30 MIN: CPT | Performed by: PHYSICIAN ASSISTANT

## 2024-11-26 RX ORDER — SODIUM CHLORIDE, SODIUM LACTATE, POTASSIUM CHLORIDE, CALCIUM CHLORIDE 600; 310; 30; 20 MG/100ML; MG/100ML; MG/100ML; MG/100ML
125 INJECTION, SOLUTION INTRAVENOUS CONTINUOUS
OUTPATIENT
Start: 2024-11-26

## 2024-11-26 NOTE — PROGRESS NOTES
[facility] - Outpatient Follow-up Note  Gabbie Angeles 79 y.o. female MRN: [id number]  Encounter: [id number]    ASSESSMENT AND PLAN:      1. Gastroesophageal reflux disease, unspecified whether esophagitis present (Primary)  2. Chronic cough    Patient has several years of chronic cough for which she was evaluated by GI back in 22.  An EGD was recommended at that time though not completed.  Patient had also endorsed traditional reflux symptoms and was started on PPI therapy.  This has improved her heartburn, though cough is persisting.  EGD now to evaluate for intraluminal pathology such as reflux esophagitis, EoE, web/ring/stricture, PUD, etc. Ddx includes dysmotility, non-GI pathology, etc    I discussed informed consent with the patient. The risks/benefits/alternatives of the procedure were discussed with the patient. Risks included, but not limited to, infection, bleeding, perforation, injury to organs in the abdomen, missed lesion and incomplete procedure were discussed. Patient was agreeable.    If w/u unremarkable, recommend referral to ENT, consider pulmonary evaluation if not yet completed.     - Ambulatory Referral to Gastroenterology  - EGD; Future    3. Personal history of colon polyps, unspecified    Hx of such, last colonoscopy in 2019 with several polyps removed though only one adenomatous.   Recall in 5 years for surveillance purposes, pt is agreeable to complete colonoscopy now.   Miralax prep instructions were given today.   I recommend taking 3 doses of miralax 2 days prior to procedure and then following traditional prep instructions the day prior to the procedure.     - Colonoscopy; Future  - Miralax/dulcolax/gatorade prep     4. Intermittent constipation    Pt notes intermittent straining, firm stool, constipation.   Update thyroid serologies now.   Recommend continuing fiber supplementation, ensuring excellent hydration.  Okay to continue probiotic if pt feels it  is helpful.   If needed, can add MiraLAX into her regimen to take on a scheduled basis.  Reviewed M OA and overall excellent safety profile.  Okay for stool softeners if pt feels they are helpful though reviewed unclear efficacy of these medications at present.     - TSH w/Reflex; Future    We will follow up after bidirectional endoscopic evaluation.   ______________________________________________________________________    SUBJECTIVE: Patient is a 79 y.o. female who presents today for follow-up regarding GERD. Pmhx sig for GERD, HLD, constipation, diverticulosis.     Patient was last evaluated in 2022.  She was dealing with a chronic cough at that time.  She was empirically tried on pantoprazole and recommended to undergo an EGD. This was not completed.     11/26/24:     Patient shares a history of GERD.  She was having some burning and reflux up into her throat and was instructed to take pantoprazole daily.  This seemed to markedly improve her symptoms.  She denies any nausea or vomiting.  She denies any dysphagia or odynophagia.  She does continue to have an intermittent dry cough, which occurs mostly in the morning while reclining.  She does have some allergies.  She denies any early satiety or abnormal weight loss over the past 6 months.    Pertaining to her bowels, she uses a fiber supplement and probiotic to help keep stools regular.  She does have occasional constipation, occurring 1-2 times per month and lasting for 2 to 3 days at a time.  She notes some intermittent straining during these episodes.  She does have some discomfort related to defecation when she is constipated, though otherwise denies this.  Her stool can range from a Willacy 1 to a 5.  No BRBPR or melena.  No nocturnal BMs.  No chronic diarrhea.    No recurrence rashes, fevers, weight loss. No mouth ulcers, recurrent joint erythema.   No family hx of colon cancer.     NSAIDs: no regular use  Etoh: none  Tobacco: none    09/2020: celiac  panel negative; DQ 2 positive DQ 8 negative    Endoscopic history:   EGD: 06/2019: gastritis, duodenitis and flat appearing mucosa   A.  Duodenum, bulb and second part, biopsies: Benign duodenal and pyloric mucosa. No villous atrophy, intraepithelial lymphocytosis or crypt hyperplasia; negative for features of malabsorptive enteropathy.  Negative for chronic or active duodenitis, dysplasia or malignancy.  B.  Stomach, antrum, biopsy:  Chronic inactive antral gastritis.  Negative for Helicobacter pylori, by H&E stain.  Negative for atrophy, intestinal metaplasia, dysplasia or carcinoma.  Portion of benign duodenal mucosa and Brunner's gland without significant pathologic alteration  Colon: 08/2019: Rectal polyp, flat polyp in the descending colon, cecal polyp, diverticulosis in the sigmoid and descending; path: Hyperplastic polyp, tubular adenoma, and colonic mucosa with focal lymphoid aggregate    Review of Systems   Otherwise Per HPI    Historical Information   Past Medical History:   Diagnosis Date    Acid reflux     Constipation     Rheumatic fever     as child    Rosacea     Seasonal allergies     Vaginal prolapse      Past Surgical History:   Procedure Laterality Date    TONSILLECTOMY      as child    TUBAL LIGATION      WISDOM TOOTH EXTRACTION       Social History   Social History     Substance and Sexual Activity   Alcohol Use Not Currently     Social History     Substance and Sexual Activity   Drug Use Never     Social History     Tobacco Use   Smoking Status Never   Smokeless Tobacco Never     Family History   Problem Relation Age of Onset    Lung cancer Father     Lung cancer Sister     Breast cancer Sister        Meds/Allergies       Current Outpatient Medications:     azelastine (ASTELIN) 0.1 % nasal spray    fluticasone (FLONASE) 50 mcg/act nasal spray    Ketotifen Fumarate (ALAWAY OP)    loratadine (CLARITIN) 10 mg tablet    Nutritional Supplements (JUICE PLUS FIBRE PO)    pantoprazole (PROTONIX) 40 mg  "tablet    Probiotic Product (PROBIOTIC PO)    No Known Allergies    Objective     Blood pressure 130/82, pulse 91, temperature 97.7 °F (36.5 °C), temperature source Temporal, height 5' 5\" (1.651 m), weight 79.4 kg (175 lb), SpO2 97%. Body mass index is 29.12 kg/m².    Physical Exam  Vitals and nursing note reviewed.   Constitutional:       General: She is not in acute distress.     Appearance: She is well-developed.   HENT:      Head: Normocephalic and atraumatic.   Eyes:      General: No scleral icterus.     Conjunctiva/sclera: Conjunctivae normal.   Cardiovascular:      Rate and Rhythm: Normal rate.      Heart sounds: Murmur heard.   Pulmonary:      Effort: Pulmonary effort is normal. No respiratory distress.   Abdominal:      General: Bowel sounds are normal. There is no distension.      Palpations: Abdomen is soft.      Tenderness: There is no abdominal tenderness. There is no guarding.   Musculoskeletal:      Right lower leg: No edema.      Left lower leg: No edema.   Skin:     General: Skin is warm and dry.      Coloration: Skin is not jaundiced.   Neurological:      General: No focal deficit present.      Mental Status: She is alert and oriented to person, place, and time.   Psychiatric:         Mood and Affect: Mood normal.         Lab Results:   No visits with results within 1 Day(s) from this visit.   Latest known visit with results is:   Appointment on 01/07/2022   Component Date Value    SARS-CoV-2 Ab, Total (Ig* 01/07/2022 Reactive (A)     SARS-CoV-2 Ab, IgG 01/07/2022 Reactive (A)      Radiology Results:   No results found.    Shannen Henderson PA-C    **Please note:  Dictation voice to text software may have been used in the creation of this record.  Occasional wrong word or “sound alike” substitutions may have occurred due to the inherent limitations of voice recognition software.  Read the chart carefully and recognize, using context, where substitutions have occurred.**  "

## 2024-11-26 NOTE — PATIENT INSTRUCTIONS
We will proceed with EGD and colonoscopy.  In general to prevent constipation I would recommend you continue on a daily fiber supplement.  Ensure you are staying well-hydrated.  If you need anything to help prevent or treat constipation, a safe and effective laxative is MiraLAX.  You can take 1 capful in 8 ounces of liquid every day and adjust to your needs.    For the bowel prep, we are going to give instructions on a 2-day cleanse because in the past it appears that your previous colonoscopy noted a suboptimal bowel prep the first time around.  2 days before the colonoscopy, I would like you to take 4 doses of MiraLAX in 28 ounces of a clear liquid and drink this.  This is to start to jumpstart your bowel cleanse.  The day before the colonoscopy please follow the traditional instructions.

## 2025-01-06 ENCOUNTER — TELEPHONE (OUTPATIENT)
Age: 80
End: 2025-01-06

## 2025-01-06 NOTE — TELEPHONE ENCOUNTER
Pt calling to have prep instructions sent to     [email or web address]  Pt has no further questions at this time.

## 2025-01-13 ENCOUNTER — NURSE TRIAGE (OUTPATIENT)
Dept: OTHER | Facility: OTHER | Age: 80
End: 2025-01-13

## 2025-01-13 NOTE — TELEPHONE ENCOUNTER
Per on call provider- pt can still have colonoscopy tomorrow. Just get a different gatorade color.    Pt called and told of instructions, pt verbalized understanding.

## 2025-01-13 NOTE — TELEPHONE ENCOUNTER
"Reason for Disposition  • [1] Caller has URGENT medicine question about med that PCP or specialist prescribed AND [2] triager unable to answer question    Answer Assessment - Initial Assessment Questions  1. NAME of MEDICINE: \"What medicine(s) are you calling about?\"      Pt has her colonoscopy tomorrow and she drank 2-8oz cups of miralax mixed with red gatorade. She realized after she drank them that she wasn't supposed to have red gatorade. She wants to know if she can still have her procedure tomorrow? she will go back out and get a different color and more miralax.    Pt also asking what time her appt is tomorrow. Pt told its at 10:15 and to arrive an hour early.    Protocols used: Medication Question Call-Adult-    "

## 2025-01-14 ENCOUNTER — ANESTHESIA EVENT (OUTPATIENT)
Dept: GASTROENTEROLOGY | Facility: HOSPITAL | Age: 80
End: 2025-01-14
Payer: COMMERCIAL

## 2025-01-14 ENCOUNTER — ANESTHESIA (OUTPATIENT)
Dept: GASTROENTEROLOGY | Facility: HOSPITAL | Age: 80
End: 2025-01-14
Payer: COMMERCIAL

## 2025-01-14 ENCOUNTER — HOSPITAL ENCOUNTER (OUTPATIENT)
Dept: GASTROENTEROLOGY | Facility: HOSPITAL | Age: 80
Setting detail: OUTPATIENT SURGERY
Discharge: HOME | End: 2025-01-14
Payer: COMMERCIAL

## 2025-01-14 VITALS
TEMPERATURE: 97.1 F | OXYGEN SATURATION: 99 % | SYSTOLIC BLOOD PRESSURE: 116 MMHG | DIASTOLIC BLOOD PRESSURE: 59 MMHG | HEART RATE: 72 BPM | BODY MASS INDEX: 29.16 KG/M2 | RESPIRATION RATE: 33 BRPM | HEIGHT: 65 IN | WEIGHT: 175 LBS

## 2025-01-14 DIAGNOSIS — Z86.0100 PERSONAL HISTORY OF COLON POLYPS, UNSPECIFIED: ICD-10-CM

## 2025-01-14 DIAGNOSIS — K21.9 GASTROESOPHAGEAL REFLUX DISEASE, UNSPECIFIED WHETHER ESOPHAGITIS PRESENT: ICD-10-CM

## 2025-01-14 PROCEDURE — 45385 COLONOSCOPY W/LESION REMOVAL: CPT | Performed by: INTERNAL MEDICINE

## 2025-01-14 PROCEDURE — 43239 EGD BIOPSY SINGLE/MULTIPLE: CPT | Performed by: INTERNAL MEDICINE

## 2025-01-14 PROCEDURE — 88305 TISSUE EXAM BY PATHOLOGIST: CPT | Performed by: PATHOLOGY

## 2025-01-14 RX ORDER — PROPOFOL 10 MG/ML
INJECTION, EMULSION INTRAVENOUS CONTINUOUS PRN
Status: DISCONTINUED | OUTPATIENT
Start: 2025-01-14 | End: 2025-01-14

## 2025-01-14 RX ORDER — LIDOCAINE HYDROCHLORIDE 20 MG/ML
INJECTION, SOLUTION EPIDURAL; INFILTRATION; INTRACAUDAL; PERINEURAL AS NEEDED
Status: DISCONTINUED | OUTPATIENT
Start: 2025-01-14 | End: 2025-01-14

## 2025-01-14 RX ORDER — PROPOFOL 10 MG/ML
INJECTION, EMULSION INTRAVENOUS AS NEEDED
Status: DISCONTINUED | OUTPATIENT
Start: 2025-01-14 | End: 2025-01-14

## 2025-01-14 RX ORDER — SODIUM CHLORIDE, SODIUM LACTATE, POTASSIUM CHLORIDE, CALCIUM CHLORIDE 600; 310; 30; 20 MG/100ML; MG/100ML; MG/100ML; MG/100ML
50 INJECTION, SOLUTION INTRAVENOUS CONTINUOUS
Status: CANCELLED | OUTPATIENT
Start: 2025-01-14

## 2025-01-14 RX ORDER — LIDOCAINE HYDROCHLORIDE 10 MG/ML
0.5 INJECTION, SOLUTION EPIDURAL; INFILTRATION; INTRACAUDAL; PERINEURAL ONCE AS NEEDED
Status: CANCELLED | OUTPATIENT
Start: 2025-01-14

## 2025-01-14 RX ORDER — SODIUM CHLORIDE, SODIUM LACTATE, POTASSIUM CHLORIDE, CALCIUM CHLORIDE 600; 310; 30; 20 MG/100ML; MG/100ML; MG/100ML; MG/100ML
INJECTION, SOLUTION INTRAVENOUS CONTINUOUS PRN
Status: DISCONTINUED | OUTPATIENT
Start: 2025-01-14 | End: 2025-01-14

## 2025-01-14 RX ADMIN — SODIUM CHLORIDE, SODIUM LACTATE, POTASSIUM CHLORIDE, AND CALCIUM CHLORIDE: .6; .31; .03; .02 INJECTION, SOLUTION INTRAVENOUS at 10:39

## 2025-01-14 RX ADMIN — TOPICAL ANESTHETIC 1 SPRAY: 200 SPRAY DENTAL; PERIODONTAL at 10:39

## 2025-01-14 RX ADMIN — PROPOFOL 100 MG: 10 INJECTION, EMULSION INTRAVENOUS at 10:43

## 2025-01-14 RX ADMIN — LIDOCAINE HYDROCHLORIDE 100 MG: 20 INJECTION, SOLUTION EPIDURAL; INFILTRATION; INTRACAUDAL at 10:43

## 2025-01-14 RX ADMIN — PROPOFOL 120 MCG/KG/MIN: 10 INJECTION, EMULSION INTRAVENOUS at 10:43

## 2025-01-14 NOTE — DISCHARGE INSTRUCTIONS
Upper Endoscopy and Colonoscopy   WHAT YOU NEED TO KNOW:   An upper endoscopy is also called an upper gastrointestinal (GI) endoscopy, or an esophagogastroduodenoscopy (EGD). It is a procedure to examine the inside of your esophagus, stomach, and duodenum (first part of the small intestine) with a scope. You may feel bloated, gassy, or have some abdominal discomfort after your procedure. Your throat may be sore for 24 to 36 hours. You may burp or pass gas from air that is still inside your body.                A colonoscopy is a procedure to examine the inside of your colon (intestine) with a scope. Polyps or tissue growths may have been removed during your colonoscopy. It is normal to feel bloated and to have some abdominal discomfort. You should be passing gas. If you have hemorrhoids or you had polyps removed, you may have a small amount of bleeding.          DISCHARGE INSTRUCTIONS:   Seek care immediately if:   You have sudden, severe abdominal pain.     You have problems swallowing.     You have a large amount of black, sticky bowel movements or blood in your bowel movements.     You have sudden trouble breathing.     You feel weak, lightheaded, or faint or your heart beats faster than normal for you.     Contact your healthcare provider if:   You have a fever and chills.      You have nausea or are vomiting.      Your abdomen is bloated or feels full and hard.     You have abdominal pain.    You have a large amount of black, sticky bowel movements or blood in your bowel movements.    You have not had a bowel movement for 3 days after your procedure.    You have rash or hives.    You have questions or concerns about your procedure.     Activity:   ·       Do not lift, strain, or run for 24 hours after your procedure.     ·       Rest after your procedure. You have been given medicine to relax you. Do not drive or make important decisions until the day after your procedure. Return to your normal activity as  directed.     ·       Relieve gas and discomfort from bloating by lying on your right side with a heating pad on your abdomen. You may need to take short walks to help the gas move out. Eat small meals until bloating is relieved.  Follow up with your healthcare provider as directed: Write down your questions so you remember to ask them during your visits.      If you take a “blood thinner”, please review the specific instructions from your endoscopist about when you should resume it. These can be found in the “Recommendation” and “Your Medication list” sections of this After Visit Summary. ;

## 2025-01-14 NOTE — H&P
History and Physical - [facility]  Gabbie Angeles 80 y.o. female MRN: [id number]                  HPI: Gabbie Angeles is a 80 y.o. year old female who presents for EGD and colonoscopy.  Please refer to Shannen's office note dated November 26, 2024 for details of her medical history.  She does have a history of chronic cough.  She also reports traditional reflux symptoms that have improved on proton pump inhibitor although her cough has persisted.  EGD was recommended.    She does have a history of colon polyps.  Last colonoscopy was in 2019.  This was done Dr. Hsu.  A pediatric scope was used.  There is a flat polyp in the descending colon.  This was an adenoma.  Patient also reports of intermittent constipation.    EGD performed in June 2019 did reveal gastritis and duodenitis.  Biopsies were negative for celiac disease.  Occ black stools, 2 weeks ago, she had a GI bud and stools were black.  She takes ibuprofen intermittently last used around krystal    Occasional constipation.   Reflux well controlled on Pantoprazole 40 mg daily. Did not take since Sunday.    There is no family history colon cancer colon polyps.      REVIEW OF SYSTEMS: Per the HPI, and otherwise unremarkable.    Historical Information   Past Medical History:   Diagnosis Date    Acid reflux     Constipation     Rheumatic fever     as child    Rosacea     Seasonal allergies     Vaginal prolapse      Past Surgical History:   Procedure Laterality Date    TONSILLECTOMY      as child    TUBAL LIGATION      WISDOM TOOTH EXTRACTION       Social History   Social History     Substance and Sexual Activity   Alcohol Use Not Currently     Social History     Substance and Sexual Activity   Drug Use Never     Social History     Tobacco Use   Smoking Status Never   Smokeless Tobacco Never     Family History   Problem Relation Age of Onset    Lung cancer Father     Lung cancer Sister     Breast cancer Sister        Meds/Allergies  "      Current Outpatient Medications:     azelastine (ASTELIN) 0.1 % nasal spray    fluticasone (FLONASE) 50 mcg/act nasal spray    Ketotifen Fumarate (ALAWAY OP)    loratadine (CLARITIN) 10 mg tablet    Nutritional Supplements (JUICE PLUS FIBRE PO)    pantoprazole (PROTONIX) 40 mg tablet    Probiotic Product (PROBIOTIC PO)    No Known Allergies    Objective     /64   Pulse 89   Temp (!) 97.3 °F (36.3 °C) (Temporal)   Resp 20   Ht 5' 5\" (1.651 m)   Wt 79.4 kg (175 lb)   SpO2 98%   BMI 29.12 kg/m²       PHYSICAL EXAM    Gen: NAD  Head: NCAT  CV: RRR  CHEST: Clear  ABD: soft, NT/ND  EXT: no edema      ASSESSMENT/PLAN:  This is a 80 y.o. year old female here for EGD and colonoscopy, and she is stable and optimized for her procedure.        "

## 2025-01-14 NOTE — ANESTHESIA POSTPROCEDURE EVALUATION
Post-Op Assessment Note    CV Status:  Stable    Pain management: adequate       Mental Status:  Sleepy   Hydration Status:  Euvolemic   PONV Controlled:  Controlled   Airway Patency:  Patent     Post Op Vitals Reviewed: Yes    No anethesia notable event occurred.    Staff: Anesthesiologist           Last Filed PACU Vitals:  Vitals Value Taken Time   Temp 97.1F    Pulse 63    /57    Resp 14    SpO2

## 2025-01-14 NOTE — ANESTHESIA PREPROCEDURE EVALUATION
Procedure:  COLONOSCOPY  EGD    Relevant Problems   CARDIO   (+) Mixed hyperlipidemia      Denies recent fever, cough or other symptom of upper respiratory tract infection.    Confirmed NPO appropriate    Physical Exam    Airway    Mallampati score: II  TM Distance: >3 FB  Neck ROM: full     Dental   No notable dental hx     Cardiovascular      Pulmonary      Other Findings  post-pubertal.      Anesthesia Plan  ASA Score- 2     Anesthesia Type- IV sedation with anesthesia with ASA Monitors.         Additional Monitors:     Airway Plan:            Plan Factors-Exercise tolerance (METS): >4 METS.Exercise comment: Able to climb flight of stairs without cardiopulmonary limitation.    Chart reviewed.   Existing labs reviewed.     Patient is not a current smoker.  Patient did not smoke on day of surgery.            Induction- intravenous.    Postoperative Plan-         Informed Consent- Anesthetic plan and risks discussed with patient.        NPO Status:  Vitals Value Taken Time   Date of last liquid 01/14/25 01/14/25 0931   Time of last liquid 0600 01/14/25 0931   Date of last solid 01/12/25 01/14/25 0931   Time of last solid 1900 01/14/25 0931

## 2025-01-16 ENCOUNTER — RESULTS FOLLOW-UP (OUTPATIENT)
Dept: GASTROENTEROLOGY | Facility: CLINIC | Age: 80
End: 2025-01-16

## 2025-01-16 PROCEDURE — 88305 TISSUE EXAM BY PATHOLOGIST: CPT | Performed by: PATHOLOGY

## 2025-01-16 NOTE — RESULT ENCOUNTER NOTE
Please recall for repeat colonoscopy in 3 years    I sent patient a GetMyRx message stating that;    Hi Ms. Angeles, writing to inform you of results from your recent endoscopy and colonoscopy.  Biopsies from the stomach were benign and negative for bacteria called H. pylori.  Continue the pantoprazole 40 mg daily.  Follow-up with Shannen in the gastroenterology office.  Colon polyps were benign, 4 of the polyps were precancerous and completely removed.  Based upon these findings you should consider repeating colonoscopy in 3 years only if you remain in excellent health at 83 years of age.  There comes a point where the risk of colonoscopy exceed the benefits.  You can discuss this further with Shannen when you see her.  Please feel free to reach out if any questions or concerns.

## 2025-01-27 ENCOUNTER — NURSE TRIAGE (OUTPATIENT)
Age: 80
End: 2025-01-27

## 2025-01-27 DIAGNOSIS — N81.11 PROLAPSE OF VAGINAL WALL WITH MIDLINE CYSTOCELE: Primary | ICD-10-CM

## 2025-01-27 DIAGNOSIS — N81.4 PROLAPSED UTERUS: ICD-10-CM

## 2025-01-27 NOTE — TELEPHONE ENCOUNTER
"Patient was seen 11/25/24 for vaginal prolapse. States she was advised by provider to try Physical Therapy. States if symptoms worsen despite therapy, surgical management would be considered.    Patient states she never received call to initiate physical therapy. States symptoms have worsened and she believes surgery may be her best option. States prolapse is now protruding through vagina. She is experiencing discomfort and difficulty urinating. Denies pain or bleeding.    Advised will send msg to provider for advice. Recommended patient call back with worsening symptoms. Patient verbalized understanding.    Answer Assessment - Initial Assessment Questions  1. SYMPTOM: \"What's the main symptom you're concerned about?\" (e.g., pain, itching, dryness)      Worsening vaginal prolapse  2. LOCATION: \"Where is the  s/s located?\" (e.g., inside/outside, left/right)      States prolapse is protruding outside of the vagina  3. ONSET: \"When did the  s/s  start?\"      Ongoing since office visit 11/25/24  4. PAIN: \"Is there any pain?\" If Yes, ask: \"How bad is it?\" (Scale: 1-10; mild, moderate, severe)      Denies  5. ITCHING: \"Is there any itching?\" If Yes, ask: \"How bad is it?\" (Scale: 1-10; mild, moderate, severe)      Denies  7. OTHER SYMPTOMS: \"Do you have any other symptoms?\" (e.g., fever, itching, vaginal bleeding, pain with urination, injury to genital area, vaginal foreign body)      Denies any other symptoms    Protocols used: Vaginal Symptoms-Adult-[place]    "

## 2025-02-03 NOTE — TELEPHONE ENCOUNTER
Patient calling in, returning call from 1/27 regarding prolapse.  Providers recommendations for urogyn consult reviewed.  Phone number provided.  No further questions or concerns.

## 2025-03-20 ENCOUNTER — OFFICE VISIT (OUTPATIENT)
Dept: GASTROENTEROLOGY | Facility: CLINIC | Age: 80
End: 2025-03-20
Payer: COMMERCIAL

## 2025-03-20 VITALS
OXYGEN SATURATION: 98 % | RESPIRATION RATE: 17 BRPM | BODY MASS INDEX: 28.99 KG/M2 | SYSTOLIC BLOOD PRESSURE: 120 MMHG | TEMPERATURE: 98 F | WEIGHT: 174 LBS | HEIGHT: 65 IN | HEART RATE: 98 BPM | DIASTOLIC BLOOD PRESSURE: 70 MMHG

## 2025-03-20 DIAGNOSIS — K59.09 INTERMITTENT CONSTIPATION: ICD-10-CM

## 2025-03-20 DIAGNOSIS — R05.3 CHRONIC COUGH: ICD-10-CM

## 2025-03-20 DIAGNOSIS — Z86.0100 PERSONAL HISTORY OF COLON POLYPS, UNSPECIFIED: ICD-10-CM

## 2025-03-20 DIAGNOSIS — K21.9 GASTROESOPHAGEAL REFLUX DISEASE WITHOUT ESOPHAGITIS: Primary | ICD-10-CM

## 2025-03-20 PROCEDURE — 99213 OFFICE O/P EST LOW 20 MIN: CPT | Performed by: PHYSICIAN ASSISTANT

## 2025-03-20 RX ORDER — ESTRADIOL 0.1 MG/G
2 CREAM VAGINAL DAILY
COMMUNITY
Start: 2025-03-06

## 2025-03-20 NOTE — PROGRESS NOTES
Name: Gabbie Angeles      : 1945      MRN: [id number]  Encounter Provider: Shannen Henderson PA-C  Encounter Date: 3/20/2025   Enc[facility]  :  Assessment & Plan  Gastroesophageal reflux disease without esophagitis  Patient has a history of GERD and chronic cough.  EGD in 2025 with gastritis, biopsies negative for H. pylori, intestinal metaplasia.  Continue PPI daily at this time.    Recommend diet and lifestyle modifications for GERD.  This includes avoiding spicy, saucy, greasy/oily foods, citrus, EtOH, NSAIDs, tobacco.  Avoid eating within 2 to 3 hours of bed.  Elevating height of bed 6 inches on blocks may be beneficial.  Weight loss would likely be beneficial.       Chronic cough  Noted, history of such.   Cough continues despite PPI therapy.   Perhaps component of LPR, offered to escalate therapy to BID dosing to see if this helps with symptoms though pt politely declined.   Consider evaluate by ENT/Pulmonary for alternate etiol.   Defer referrals to PCP.        Intermittent constipation  Pt with hx of intermittent straining/firm stool/incomplete evacuation.   Updating thyroid serologies.   Continue fiber supplement and excellent hydration.  Reviewed recommendation for addition of osmotic laxative miralax if needed on a PRN or daily basis.   Okay for PRN usage of stimulant type laxative for persistent symptoms.        Personal history of colon polyps, unspecified  Pt had several adenomas removed during colonoscopy in 2025.   Endoscopist recommended 3 year recall for surveillance purposes pending pt's overall health.          We will follow up with pt in 6-12 months to reassess symptoms, sooner if clinically indicated.     History of Present Illness   HPI  Gabbie Angeles is a 80 y.o. female who presents for f/u from EGD/colon. Pmhx sig for GERD, HLD, constipation, diverticulosis.     History obtained from: patient    Patient was  initially evaluated in 11/2024.  She had dealt with a chronic cough and was started on PPI. She had EGD completed which demonstrated some gastritis. She was also due for colonoscopy and had 4 polyps removed.     03/21/25:     Patient is doing well.  She is not having any significant heartburn or indigestion.  No nausea or vomiting.  She still has a dry cough at times, and is unsure as to whether PPI medication has been helpful for her symptoms.  No dysphagia or odynophagia.  Weight has been stable.    Was still dealing with some intermittent constipation, straining, saw urogynecology and they did recommend taking a laxative which she started on some type of senna-like laxative. Feels helpful to ease her evacuation. Having BM just about daily at this time. No BRBPR or melena. No nocturnal BM.     NSAIDs: no regular use  Etoh: none  Tobacco: none     09/2020: celiac panel negative; DQ 2 positive DQ 8 negative     Endoscopic history:   EGD: 06/2019: gastritis, duodenitis and flat appearing mucosa   A.  Duodenum, bulb and second part, biopsies: Benign duodenal and pyloric mucosa. No villous atrophy, intraepithelial lymphocytosis or crypt hyperplasia; negative for features of malabsorptive enteropathy.  Negative for chronic or active duodenitis, dysplasia or malignancy.  B.  Stomach, antrum, biopsy:  Chronic inactive antral gastritis.  Negative for Helicobacter pylori, by H&E stain.  Negative for atrophy, intestinal metaplasia, dysplasia or carcinoma.  Portion of benign duodenal mucosa and Brunner's gland without significant pathologic alteration  Colon: 08/2019: Rectal polyp, flat polyp in the descending colon, cecal polyp, diverticulosis in the sigmoid and descending; path: Hyperplastic polyp, tubular adenoma, and colonic mucosa with focal lymphoid aggregate  EGD: 01/2025: Mild erythematous mucosa in the antrum   A. Stomach, antrum, biopsy: Gastric oxyntic and transitional mucosa with minimal chronic inflammation.  Negative for intestinal metaplasia and dysplasia. Negative for Helicobacter pylori-type organisms on H&E stain  Colon: 01/2025: Two polyps measuring from 5 mm up to 10 mm in the cecum; 6 mm sessile polyp proximal ascending colon; 4 mm sessile polyp distal transverse colon; Mild to moderate diverticulosis ascending through descending colon; Moderate diverticulosis sigmoid colon   B. Cecum, polyp, polypectomy: Fragments of tubular adenoma. Negative for high grade dysplasia and malignancy.     C. Right/Ascending colon, proximal polyp, polypectomy: Tubular adenoma.  Negative for high grade dysplasia and malignancy.    D. Transverse colon, distal polyp, polypectomy: Sessile serrated adenoma    Review of Systems  Per HPI    Past Medical History   Past Medical History:   Diagnosis Date    Acid reflux     Constipation     Rheumatic fever     as child    Rosacea     Seasonal allergies     Vaginal prolapse      Past Surgical History:   Procedure Laterality Date    TONSILLECTOMY      as child    TUBAL LIGATION      WISDOM TOOTH EXTRACTION       Family History   Problem Relation Age of Onset    Lung cancer Father     Cancer Father     Lung cancer Sister     Breast cancer Sister     Cancer Sister       reports that she has never smoked. She has never used smokeless tobacco. She reports that she does not currently use alcohol. She reports that she does not use drugs.  Current Outpatient Medications   Medication Instructions    azelastine (ASTELIN) 0.1 % nasal spray 1 spray, Nasal, 2 times daily, Use in each nostril as directed    estradiol (ESTRACE) 2 g, Daily    fluticasone (FLONASE) 50 mcg/act nasal spray 1 spray, Daily    Ketotifen Fumarate (ALAWAY OP) 1 drop, Every morning    loratadine (CLARITIN) 10 mg, Every morning    Nutritional Supplements (JUICE PLUS FIBRE PO) 1 capsule, Every morning    pantoprazole (PROTONIX) 40 mg, Oral, Daily    Probiotic Product (PROBIOTIC PO) Daily   No Known Allergies      Objective   /70  "(BP Location: Right arm, Patient Position: Sitting, Cuff Size: Adult)   Pulse 98   Temp 98 °F (36.7 °C) (Temporal)   Resp 17   Ht 5' 5\" (1.651 m)   Wt 78.9 kg (174 lb)   SpO2 98%   BMI 28.96 kg/m²      Physical Exam  Vitals and nursing note reviewed.   Constitutional:       General: She is not in acute distress.     Appearance: She is well-developed.   HENT:      Head: Normocephalic and atraumatic.   Eyes:      General: No scleral icterus.     Conjunctiva/sclera: Conjunctivae normal.   Cardiovascular:      Rate and Rhythm: Normal rate.   Pulmonary:      Effort: Pulmonary effort is normal. No respiratory distress.   Abdominal:      General: Bowel sounds are normal. There is no distension.      Palpations: Abdomen is soft.      Tenderness: There is no abdominal tenderness. There is no guarding or rebound.   Skin:     General: Skin is warm and dry.      Coloration: Skin is not jaundiced.   Neurological:      General: No focal deficit present.      Mental Status: She is alert.   Psychiatric:         Mood and Affect: Mood normal.         Behavior: Behavior normal.       **Please note:  Dictation voice to text software may have been used in the creation of this record.  Occasional wrong word or “sound alike” substitutions may have occurred due to the inherent limitations of voice recognition software.  Read the chart carefully and recognize, using context, where substitutions have occurred.**  "

## 2025-03-21 ENCOUNTER — APPOINTMENT (OUTPATIENT)
Dept: LAB | Facility: CLINIC | Age: 80
End: 2025-03-21
Payer: COMMERCIAL

## 2025-03-21 ENCOUNTER — RESULTS FOLLOW-UP (OUTPATIENT)
Dept: GASTROENTEROLOGY | Facility: CLINIC | Age: 80
End: 2025-03-21

## 2025-03-21 DIAGNOSIS — K21.9 GASTROESOPHAGEAL REFLUX DISEASE, UNSPECIFIED WHETHER ESOPHAGITIS PRESENT: ICD-10-CM

## 2025-03-21 DIAGNOSIS — K59.09 INTERMITTENT CONSTIPATION: ICD-10-CM

## 2025-03-21 DIAGNOSIS — Z00.00 ENCOUNTER FOR MEDICARE ANNUAL WELLNESS EXAM: ICD-10-CM

## 2025-03-21 LAB
BASOPHILS # BLD AUTO: 0.03 THOUSANDS/ÂΜL (ref 0–0.1)
BASOPHILS NFR BLD AUTO: 1 % (ref 0–1)
EOSINOPHIL # BLD AUTO: 0.11 THOUSAND/ÂΜL (ref 0–0.61)
EOSINOPHIL NFR BLD AUTO: 2 % (ref 0–6)
ERYTHROCYTE [DISTWIDTH] IN BLOOD BY AUTOMATED COUNT: 12.9 % (ref 11.6–15.1)
HCT VFR BLD AUTO: 46.2 % (ref 34.8–46.1)
HGB BLD-MCNC: 14.7 G/DL (ref 11.5–15.4)
IMM GRANULOCYTES # BLD AUTO: 0.02 THOUSAND/UL (ref 0–0.2)
IMM GRANULOCYTES NFR BLD AUTO: 0 % (ref 0–2)
LYMPHOCYTES # BLD AUTO: 1.53 THOUSANDS/ÂΜL (ref 0.6–4.47)
LYMPHOCYTES NFR BLD AUTO: 29 % (ref 14–44)
MCH RBC QN AUTO: 29 PG (ref 26.8–34.3)
MCHC RBC AUTO-ENTMCNC: 31.8 G/DL (ref 31.4–37.4)
MCV RBC AUTO: 91 FL (ref 82–98)
MONOCYTES # BLD AUTO: 0.5 THOUSAND/ÂΜL (ref 0.17–1.22)
MONOCYTES NFR BLD AUTO: 10 % (ref 4–12)
NEUTROPHILS # BLD AUTO: 3.1 THOUSANDS/ÂΜL (ref 1.85–7.62)
NEUTS SEG NFR BLD AUTO: 58 % (ref 43–75)
NRBC BLD AUTO-RTO: 0 /100 WBCS
PLATELET # BLD AUTO: 228 THOUSANDS/UL (ref 149–390)
PMV BLD AUTO: 10.1 FL (ref 8.9–12.7)
RBC # BLD AUTO: 5.07 MILLION/UL (ref 3.81–5.12)
TSH SERPL DL<=0.05 MIU/L-ACNC: 0.66 UIU/ML (ref 0.45–4.5)
WBC # BLD AUTO: 5.29 THOUSAND/UL (ref 4.31–10.16)

## 2025-03-21 PROCEDURE — 80053 COMPREHEN METABOLIC PANEL: CPT

## 2025-03-21 PROCEDURE — 84443 ASSAY THYROID STIM HORMONE: CPT

## 2025-03-21 PROCEDURE — 36415 COLL VENOUS BLD VENIPUNCTURE: CPT

## 2025-03-21 PROCEDURE — 85025 COMPLETE CBC W/AUTO DIFF WBC: CPT

## 2025-03-21 PROCEDURE — 80061 LIPID PANEL: CPT

## 2025-03-22 LAB
ALBUMIN SERPL BCG-MCNC: 4.1 G/DL (ref 3.5–5)
ALP SERPL-CCNC: 65 U/L (ref 34–104)
ALT SERPL W P-5'-P-CCNC: 21 U/L (ref 7–52)
ANION GAP SERPL CALCULATED.3IONS-SCNC: 8 MMOL/L (ref 4–13)
AST SERPL W P-5'-P-CCNC: 17 U/L (ref 13–39)
BILIRUB SERPL-MCNC: 0.5 MG/DL (ref 0.2–1)
BUN SERPL-MCNC: 11 MG/DL (ref 5–25)
CALCIUM SERPL-MCNC: 9.7 MG/DL (ref 8.4–10.2)
CHLORIDE SERPL-SCNC: 108 MMOL/L (ref 96–108)
CHOLEST SERPL-MCNC: 183 MG/DL (ref ?–200)
CO2 SERPL-SCNC: 25 MMOL/L (ref 21–32)
CREAT SERPL-MCNC: 0.9 MG/DL (ref 0.6–1.3)
GFR SERPL CREATININE-BSD FRML MDRD: 61 ML/MIN/1.73SQ M
GLUCOSE P FAST SERPL-MCNC: 95 MG/DL (ref 65–99)
HDLC SERPL-MCNC: 67 MG/DL
LDLC SERPL CALC-MCNC: 94 MG/DL (ref 0–100)
NONHDLC SERPL-MCNC: 116 MG/DL
POTASSIUM SERPL-SCNC: 5.1 MMOL/L (ref 3.5–5.3)
PROT SERPL-MCNC: 6.5 G/DL (ref 6.4–8.4)
SODIUM SERPL-SCNC: 141 MMOL/L (ref 135–147)
TRIGL SERPL-MCNC: 110 MG/DL (ref ?–150)

## 2025-04-30 DIAGNOSIS — R05.3 CHRONIC COUGH: ICD-10-CM

## 2025-04-30 DIAGNOSIS — K21.9 GASTROESOPHAGEAL REFLUX DISEASE, UNSPECIFIED WHETHER ESOPHAGITIS PRESENT: ICD-10-CM

## 2025-04-30 RX ORDER — PANTOPRAZOLE SODIUM 40 MG/1
40 TABLET, DELAYED RELEASE ORAL DAILY
Qty: 90 TABLET | Refills: 1 | Status: SHIPPED | OUTPATIENT
Start: 2025-04-30

## 2025-05-20 ENCOUNTER — APPOINTMENT (OUTPATIENT)
Dept: LAB | Facility: HOSPITAL | Age: 80
End: 2025-05-20
Attending: OBSTETRICS & GYNECOLOGY

## 2025-05-20 DIAGNOSIS — R39.15 URGENCY OF URINATION: ICD-10-CM

## 2025-05-20 DIAGNOSIS — Z01.812 PRE-OPERATIVE LABORATORY EXAMINATION: ICD-10-CM

## 2025-05-20 LAB
ATRIAL RATE: 78 BPM
P AXIS: 49 DEGREES
PR INTERVAL: 126 MS
QRS AXIS: 31 DEGREES
QRSD INTERVAL: 86 MS
QT INTERVAL: 372 MS
QTC INTERVAL: 424 MS
T WAVE AXIS: 58 DEGREES
VENTRICULAR RATE: 78 BPM

## 2025-06-06 NOTE — PRE-PROCEDURE INSTRUCTIONS
Pre-Surgery Instructions:   Medication Instructions    estradiol (ESTRACE) 0.1 mg/g vaginal cream Hold day of surgery.    fluticasone (FLONASE) 50 mcg/act nasal spray Uses PRN- OK to take day of surgery    Ketotifen Fumarate (ALAWAY OP) Take day of surgery.    loratadine (CLARITIN) 10 mg tablet Uses PRN- OK to take day of surgery    Nutritional Supplements (JUICE PLUS FIBRE PO) Hold day of surgery.    pantoprazole (PROTONIX) 40 mg tablet Take day of surgery.    Probiotic Product (PROBIOTIC PO) Hold day of surgery.   Medication instructions for day of surgery reviewed. Please take all instructed medications with only a sip of water. Please do not take any over the counter (non-prescribed) vitamins or supplements for one week prior to date of surgery.      You will receive a call one business day prior to surgery with an arrival time and hospital directions. If your surgery is scheduled on a Monday, the hospital will be calling you on the Friday prior to your surgery. If you have not heard from anyone by 8pm, please call the hospital supervisor through the hospital  at [phone]. ([place] [phone] or [place] [phone]).    Do not eat or drink anything after midnight the night before your surgery, including candy, mints, lifesavers, or chewing gum. Do not drink alcohol 24hrs before your surgery. Try not to smoke at least 24hrs before your surgery.       Follow the pre surgery showering instructions as listed in the “My Surgical Experience Booklet” or otherwise provided by your surgeon's office. Do not use a blade to shave the surgical area 1 week before surgery. It is okay to use a clean electric clippers up to 24 hours before surgery. Do not apply any lotions, creams, including makeup, cologne, deodorant, or perfumes after showering on the day of your surgery. Do not use dry shampoo, hair spray, hair gel, or any type of hair products.     No contact lenses, eye make-up, or artificial eyelashes.  Remove nail polish, including gel polish, and any artificial, gel, or acrylic nails if possible. Remove all jewelry including rings and body piercing jewelry.     Wear causal clothing that is easy to take on and off. Consider your type of surgery.    Keep any valuables, jewelry, piercings at home. Please bring any specially ordered equipment (sling, braces) if indicated.    Arrange for a responsible person to drive you to and from the hospital on the day of your surgery. Please confirm the visitor policy for the day of your procedure when you receive your phone call with an arrival time.     Call the surgeon's office with any new illnesses, exposures, or additional questions prior to surgery.    Please reference your “My Surgical Experience Booklet” for additional information to prepare for your upcoming surgery.

## 2025-06-09 ENCOUNTER — ANESTHESIA EVENT (OUTPATIENT)
Dept: PERIOP | Facility: HOSPITAL | Age: 80
End: 2025-06-09
Payer: COMMERCIAL

## 2025-06-10 ENCOUNTER — ANESTHESIA (OUTPATIENT)
Dept: PERIOP | Facility: HOSPITAL | Age: 80
End: 2025-06-10
Payer: COMMERCIAL

## 2025-06-10 ENCOUNTER — HOSPITAL ENCOUNTER (OUTPATIENT)
Facility: HOSPITAL | Age: 80
Setting detail: OUTPATIENT SURGERY
Discharge: HOME | End: 2025-06-10
Attending: OBSTETRICS & GYNECOLOGY | Admitting: OBSTETRICS & GYNECOLOGY
Payer: COMMERCIAL

## 2025-06-10 VITALS
WEIGHT: 176.59 LBS | RESPIRATION RATE: 18 BRPM | OXYGEN SATURATION: 94 % | DIASTOLIC BLOOD PRESSURE: 67 MMHG | HEIGHT: 65 IN | HEART RATE: 77 BPM | BODY MASS INDEX: 29.42 KG/M2 | TEMPERATURE: 97.6 F | SYSTOLIC BLOOD PRESSURE: 138 MMHG

## 2025-06-10 DIAGNOSIS — G89.18 POSTOPERATIVE PAIN: ICD-10-CM

## 2025-06-10 DIAGNOSIS — N81.2 INCOMPLETE UTEROVAGINAL PROLAPSE: Primary | ICD-10-CM

## 2025-06-10 PROCEDURE — 51798 US URINE CAPACITY MEASURE: CPT | Performed by: OBSTETRICS & GYNECOLOGY

## 2025-06-10 PROCEDURE — C1771 REP DEV, URINARY, W/SLING: HCPCS | Performed by: OBSTETRICS & GYNECOLOGY

## 2025-06-10 DEVICE — SINGLE INCISION SLING SYSTEM
Type: IMPLANTABLE DEVICE | Site: VAGINA | Status: FUNCTIONAL
Brand: ALTIS

## 2025-06-10 DEVICE — IMPLANTABLE DEVICE
Type: IMPLANTABLE DEVICE | Site: VAGINA | Status: FUNCTIONAL
Brand: ENPLACE

## 2025-06-10 RX ORDER — MAGNESIUM HYDROXIDE 1200 MG/15ML
LIQUID ORAL AS NEEDED
Status: DISCONTINUED | OUTPATIENT
Start: 2025-06-10 | End: 2025-06-10 | Stop reason: HOSPADM

## 2025-06-10 RX ORDER — FUROSEMIDE 10 MG/ML
INJECTION INTRAMUSCULAR; INTRAVENOUS AS NEEDED
Status: DISCONTINUED | OUTPATIENT
Start: 2025-06-10 | End: 2025-06-10

## 2025-06-10 RX ORDER — LIDOCAINE HYDROCHLORIDE AND EPINEPHRINE 10; 10 MG/ML; UG/ML
INJECTION, SOLUTION INFILTRATION; PERINEURAL AS NEEDED
Status: DISCONTINUED | OUTPATIENT
Start: 2025-06-10 | End: 2025-06-10 | Stop reason: HOSPADM

## 2025-06-10 RX ORDER — PROPOFOL 10 MG/ML
INJECTION, EMULSION INTRAVENOUS AS NEEDED
Status: DISCONTINUED | OUTPATIENT
Start: 2025-06-10 | End: 2025-06-10

## 2025-06-10 RX ORDER — IBUPROFEN 600 MG/1
600 TABLET, FILM COATED ORAL EVERY 6 HOURS PRN
Status: DISCONTINUED | OUTPATIENT
Start: 2025-06-10 | End: 2025-06-10 | Stop reason: HOSPADM

## 2025-06-10 RX ORDER — DOCUSATE SODIUM 100 MG/1
100 CAPSULE, LIQUID FILLED ORAL 2 TIMES DAILY
Qty: 60 CAPSULE | Refills: 1 | Status: SHIPPED | OUTPATIENT
Start: 2025-06-10

## 2025-06-10 RX ORDER — CEFAZOLIN SODIUM 2 G/50ML
2000 SOLUTION INTRAVENOUS ONCE
Status: DISCONTINUED | OUTPATIENT
Start: 2025-06-10 | End: 2025-06-10 | Stop reason: HOSPADM

## 2025-06-10 RX ORDER — ACETAMINOPHEN 325 MG/1
650 TABLET ORAL EVERY 6 HOURS PRN
Start: 2025-06-10

## 2025-06-10 RX ORDER — ACETAMINOPHEN 325 MG/1
975 TABLET ORAL EVERY 6 HOURS PRN
Status: DISCONTINUED | OUTPATIENT
Start: 2025-06-10 | End: 2025-06-10 | Stop reason: HOSPADM

## 2025-06-10 RX ORDER — ACETAMINOPHEN 325 MG/1
975 TABLET ORAL ONCE
Status: COMPLETED | OUTPATIENT
Start: 2025-06-10 | End: 2025-06-10

## 2025-06-10 RX ORDER — FENTANYL CITRATE 50 UG/ML
INJECTION, SOLUTION INTRAMUSCULAR; INTRAVENOUS AS NEEDED
Status: DISCONTINUED | OUTPATIENT
Start: 2025-06-10 | End: 2025-06-10

## 2025-06-10 RX ORDER — BUPIVACAINE HYDROCHLORIDE 2.5 MG/ML
INJECTION, SOLUTION EPIDURAL; INFILTRATION; INTRACAUDAL; PERINEURAL AS NEEDED
Status: DISCONTINUED | OUTPATIENT
Start: 2025-06-10 | End: 2025-06-10 | Stop reason: HOSPADM

## 2025-06-10 RX ORDER — SODIUM CHLORIDE, SODIUM LACTATE, POTASSIUM CHLORIDE, CALCIUM CHLORIDE 600; 310; 30; 20 MG/100ML; MG/100ML; MG/100ML; MG/100ML
125 INJECTION, SOLUTION INTRAVENOUS CONTINUOUS
Status: DISCONTINUED | OUTPATIENT
Start: 2025-06-10 | End: 2025-06-10 | Stop reason: HOSPADM

## 2025-06-10 RX ORDER — ONDANSETRON 2 MG/ML
4 INJECTION INTRAMUSCULAR; INTRAVENOUS EVERY 6 HOURS PRN
Status: DISCONTINUED | OUTPATIENT
Start: 2025-06-10 | End: 2025-06-10 | Stop reason: HOSPADM

## 2025-06-10 RX ORDER — DEXAMETHASONE SODIUM PHOSPHATE 10 MG/ML
INJECTION, SOLUTION INTRAMUSCULAR; INTRAVENOUS AS NEEDED
Status: DISCONTINUED | OUTPATIENT
Start: 2025-06-10 | End: 2025-06-10

## 2025-06-10 RX ORDER — PROPOFOL 10 MG/ML
INJECTION, EMULSION INTRAVENOUS CONTINUOUS PRN
Status: DISCONTINUED | OUTPATIENT
Start: 2025-06-10 | End: 2025-06-10

## 2025-06-10 RX ORDER — MIDAZOLAM HYDROCHLORIDE 2 MG/2ML
INJECTION, SOLUTION INTRAMUSCULAR; INTRAVENOUS AS NEEDED
Status: DISCONTINUED | OUTPATIENT
Start: 2025-06-10 | End: 2025-06-10

## 2025-06-10 RX ORDER — ONDANSETRON 2 MG/ML
INJECTION INTRAMUSCULAR; INTRAVENOUS AS NEEDED
Status: DISCONTINUED | OUTPATIENT
Start: 2025-06-10 | End: 2025-06-10

## 2025-06-10 RX ORDER — LIDOCAINE HYDROCHLORIDE 20 MG/ML
INJECTION, SOLUTION EPIDURAL; INFILTRATION; INTRACAUDAL; PERINEURAL AS NEEDED
Status: DISCONTINUED | OUTPATIENT
Start: 2025-06-10 | End: 2025-06-10

## 2025-06-10 RX ADMIN — SODIUM CHLORIDE, SODIUM LACTATE, POTASSIUM CHLORIDE, AND CALCIUM CHLORIDE: .6; .31; .03; .02 INJECTION, SOLUTION INTRAVENOUS at 14:21

## 2025-06-10 RX ADMIN — PROPOFOL 20 MG: 10 INJECTION, EMULSION INTRAVENOUS at 13:57

## 2025-06-10 RX ADMIN — PROPOFOL 40 MCG/KG/MIN: 10 INJECTION, EMULSION INTRAVENOUS at 12:44

## 2025-06-10 RX ADMIN — SODIUM CHLORIDE, SODIUM LACTATE, POTASSIUM CHLORIDE, AND CALCIUM CHLORIDE 125 ML/HR: .6; .31; .03; .02 INJECTION, SOLUTION INTRAVENOUS at 11:23

## 2025-06-10 RX ADMIN — ACETAMINOPHEN 975 MG: 325 TABLET ORAL at 11:14

## 2025-06-10 RX ADMIN — FENTANYL CITRATE 25 MCG: 50 INJECTION INTRAMUSCULAR; INTRAVENOUS at 12:56

## 2025-06-10 RX ADMIN — PROPOFOL 20 MG: 10 INJECTION, EMULSION INTRAVENOUS at 14:00

## 2025-06-10 RX ADMIN — FUROSEMIDE 10 MG: 10 INJECTION, SOLUTION INTRAVENOUS at 13:49

## 2025-06-10 RX ADMIN — FENTANYL CITRATE 25 MCG: 50 INJECTION INTRAMUSCULAR; INTRAVENOUS at 13:52

## 2025-06-10 RX ADMIN — MIDAZOLAM 1 MG: 1 INJECTION INTRAMUSCULAR; INTRAVENOUS at 12:40

## 2025-06-10 RX ADMIN — LIDOCAINE HYDROCHLORIDE 80 MG: 20 INJECTION, SOLUTION EPIDURAL; INFILTRATION; INTRACAUDAL at 12:43

## 2025-06-10 RX ADMIN — DEXAMETHASONE SODIUM PHOSPHATE 10 MG: 10 INJECTION, SOLUTION INTRAMUSCULAR; INTRAVENOUS at 12:49

## 2025-06-10 RX ADMIN — PROPOFOL 20 MG: 10 INJECTION, EMULSION INTRAVENOUS at 13:01

## 2025-06-10 RX ADMIN — FENTANYL CITRATE 50 MCG: 50 INJECTION INTRAMUSCULAR; INTRAVENOUS at 12:42

## 2025-06-10 RX ADMIN — ONDANSETRON 4 MG: 2 INJECTION INTRAMUSCULAR; INTRAVENOUS at 13:06

## 2025-06-10 NOTE — DISCHARGE INSTR - AVS FIRST PAGE
Post-Urogynecologic Surgery Discharge Instructions:  1. Nothing in the vagina for six weeks  2. You may take stairs one at a time, touching each step with both feet for the first few days, then as tolerated.  3. Call the office for fever greater than 100.4'F, heavy vaginal bleeding, or increasing pain.  4. Activity as tolerated.  5. Please remove the vaginal packing in the morning on POD#3, Friday  6. Please take the following for postoperative bowel regimen: colace 100 mg twice daily, miralax 17g daily, milk of magnesia as needed  7. Do not use topical estrogen until six weeks postoperatively    Post Operative Pain Management:  If you have cramping or mild pain you may take 600 mg Ibuprofen every 6 hours to relieve.     If you continue to have residual mild pain not entirely relieved by Ibuprofen then you may take 650 mg of tylenol every 6 hours.       If you have any questions regarding your prescriptions please call your doctor.    Apical Vaginal Repair (EnPlace System)    WHAT YOU NEED TO KNOW:   An apical vaginal repair is a procedure to lift the cervix, vagina  and surrounding structures to the normal anatomical position. This can help prevent you from having a bulge sensation in the vagina. The procedure is also called an EnPlace procedure.       **** VAGINAL PACKING WAS PLACED IN THE VAGINA- This packing will need to be removed at home on POD #3 ****      DISCHARGE INSTRUCTIONS:   Medicines:   Pain medicine:    You may need medicine to take away or decrease pain.     Learn how to take your medicine. Ask what medicine and how much you should take. Be sure you know how, when, and how often to take it.    Do not wait until the pain is severe before you take your medicine. Tell caregivers if your pain does not decrease.    Pain medicine can make you dizzy or sleepy. Prevent falls by calling someone when you get out of bed or if you need help.    Antibiotics:  This medicine is given to fight or prevent an  infection caused by bacteria. Always take your antibiotics exactly as ordered by your healthcare provider. Do not stop taking your medicine unless directed by your healthcare provider. Never save antibiotics or take leftover antibiotics that were given to you for another illness.    Take your medicine as directed.  Contact your healthcare provider if you think your medicine is not helping or if you have side effects. Tell him or her if you are allergic to any medicine. Keep a list of the medicines, vitamins, and herbs you take. Include the amounts, and when and why you take them. Bring the list or the pill bottles to follow-up visits. Carry your medicine list with you in case of an emergency.  Follow up with your healthcare provider as directed:  Write down your questions so you remember to ask them during your visits.   Self-care:   Vaginal packing: Vaginal packing was placed into the vagina. You will remove this packing on POD #3 early in the morning. (This is the 3rd day after your surgery). The office will call to remind you to remove this packing.     Sex:  Do not have sex until your healthcare provider says it is okay.    Kegel exercises:  To do kegel exercises, squeeze your pelvic floor muscles for 5 to 10 seconds, then release. Regular kegel exercises will help your pelvic floor muscles become stronger. This will help prevent you from leaking urine. Ask your healthcare provider when to start these exercises and how often to do them.    Sanitary pad:  Change your sanitary pad regularly. Keep track of how often you change the pad.    Valdes catheter:  Keep the bag below your waist. This will help prevent infection and other problems caused by urine flowing back into your bladder. Do not pull on the catheter because this can cause pain and bleeding, and the catheter could come out. Keep the catheter tubing free of kinks so your urine will flow into the bag. Your healthcare provider will remove the catheter as  soon as possible, to help prevent infection.    Wound care:  When you are allowed to bathe or shower, carefully wash your vaginal area with soap and water.     Do not put pressure on your abdomen:  This will help prevent damage to your surgery area. Do not strain, lift heavy objects, or stand for a very long time. Do not perform strenuous exercises, such as running and weight lifting.    Activity:  You may need to start walking within a few days after your procedure. Ask your healthcare provider when to start and how long you should walk. Ask about any other exercises that may be right for you.    Support socks:  You may need to wear support socks. These are tight socks that help increase the circulation in your legs until you are more active. This helps prevent blood clots.  Contact your healthcare provider if:   You soak a sanitary pad with blood every hour for 4 hours.    You have vaginal pain that does not go away even after you take pain medicine.    You have pus or a foul-smelling discharge from your genital area.     You see blood in your urine.    You have pain during sex.    You have a fever, chills, a cough, or feel weak and achy.    You have nausea and vomiting.    You have questions or concerns about your condition or care.  Seek care immediately or call 911 if:   You feel something is bulging out into your vagina or rectum and not going back in.    You cannot urinate.    Your arm or leg feels warm, tender, and painful. It may look swollen and red.    You suddenly feel lightheaded and short of breath.    You have chest pain. You may have more pain when you take a deep breath or cough. You may cough up blood.  © 2017 [employer] Information is for End User's use only and may not be sold, redistributed or otherwise used for commercial purposes. All illustrations and images included in CareNotes® are the copyrighted property of [employer] or [employer].  The above  information is an  only. It is not intended as medical advice for individual conditions or treatments. Talk to your doctor, nurse or pharmacist before following any medical regimen to see if it is safe and effective for you.

## 2025-06-10 NOTE — OP NOTE
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]    Post-Op Diagnosis Codes:     * Incomplete uterovaginal prolapse [N81.2]     * Cystocele, midline [N81.11]     * Rectocele [N81.6]     * Pelvic muscle wasting [N81.84]     * Other female genital prolapse [N81.89]     * 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]    Procedure(s):  1 - ANTERIOR/POSTERIOR COLPORRHAPHY  2 - PUBOVAGINAL SLING  3 - COLPOSUSPENSION VAGINAL EXTRAPERITONEAL(ENPLACE); EUA  4 - CYSTOSCOPY    Specimen(s):  * No specimens in log *    Estimated Blood Loss:   Minimal    Drains:  * No LDAs found *    Anesthesia Type:   IV Sedation with Anesthesia    Operative Indications:  1 - Cystocele, midline [N81.11], Rectocele [N81.6]  2 - 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 Bilateral ureteral efflux visualized  Minimal leakage from the urethral meatus following sling tensioning with intraoperative Crede maneuver  Hemostatic vaginal incisions        Complications:   None apparent    Procedure and Technique:  Appropriate preoperative antibiotics chosen per ACOG guidelines were given.  Bilateral SCDs were placed in the lower extremities for DVT prevention prior to the institution of anesthesia.    The patient was identified in the holding area by the operating room staff and attending physician. She was taken to the operating room where anesthesia was instituted without complications. She was placed in the dorsal lithotomy position with the legs in Wodo stirrups with care taken to avoid excessive flexion or extension of her lower extremities. The patient was prepped and draped in the usual sterile fashion. A Valdes catheter was inserted.    A finger was placed in the lateral vagina, with careful palpation of the ischial spines and sacrospinous ligaments bilaterally.  The right finger sleeve was then applied to the surgeon's index finger.  These landmarks were then again palpated with the finger sleeve in place.  Location along the sacrospinous ligament approximately in the middle 1/3 of the ligament as well as the lower 1/3 of the ligament was carefully palpated and the finger sleeve port was loaded with a 7 inch 22G spinal needle to inject the trocar site initially with Marcaine/Lidocaine/Epinephrine local totaling 1.5 cc. The trocar device loaded with the 0 Prolene suture was brought into the field. The trocar device was inserted into the finger sleeve port and the device was used to place the 0 Prolene suture at this location while the surgeons finger was firmly pressed against the ligament, approximately 2 cm medial to the ischial spine using the sacrum as a guide.  Once the suture was anchored in place, which was confirmed by tenting of the ligament with gentle tugging, all instruments were removed from the vagina.  Rectal exam confirmed proper location as well. She had the exact same procedure performed on the contralateral side. Next local was injected  into the cervico-vaginal junction and a 3 cm anterior vaginal epithelial incision was made along the anterior cervico-vaginal junction, until the cervical stroma was encountered. Next, one strand of the Prolene suture was passed backwards using a large coto needle through its entering point in the vaginal wall and medially through the cervical stromal tissue. The second strand on the same side was passed caudally and proximal to the cervical os using the same technique. The exact same procedure was performed on the contralateral side.     Attention was turned to the anterior wall where a persistent cystocele was noted. Two Allis clamps were applied horizontally along the vaginal incision to grasp the dependent portion of the cystocele for traction. The epithelium was further  from the vaginal muscularis sharply with tenotomy and metzenbaum scissors and bluntly with peanut sponges and a raytec. Excess vaginal mucosal skin was trimmed. The vaginal wall was then plicated in a vertical imbricating fashion using 2-0 PDS. We then began closure of the anterior vaginal epithelial incision with a 2-0 Vicryl sutures in a running locked stitch from lateral to medial with two separate sutures from each vaginal apex. The incision was intentionally left open after the first few throws from each side. The EnPlace prolene sutures were then tied down separately, one at a time until proper apical support was obtained and then they were tied together pushing the prolapse up to the anatomic position of the vaginal apex. The 2-0 PDS stitches were then tied down. We then finished closure of the anterior vaginal epithelial incision with the two 2-0 Vicryl sutures in a running locked stitch from lateral to medial. The incision was inspected and noted to be hemostatic.      We turned our attention for performance of the single incision sling. At this time, the mid urethral zone was identified in reference to the Valdes catheter and  urethral meatus and local anesthetic solution was injected into the anterior vaginal wall at the mid urethral level for hydrodissection and vasoconstriction. Next, local was injected at the midline and to the left and right of midline directing the infiltration laterally towards the cephalad aspect of the inferior pubic ramus bilaterally. Care was taken to ensure that the sulcus was flattened and free of infiltration to minimize chance for buttonholing or tapping too close to the anterolateral sulcus. After completion of hydrodissection, 2 Allis clamps were used to grasp the anterior vaginal wall for traction. A 1.5 cm incision was made to the midline. Two Allis clamps were then placed on each cut edge of the incision for stabilization. Tenotomy scissors were used to create a small vaginal tunnel with sharp and blunt dissection above the anterior vaginal wall directed laterally towards the cephalad aspect of the inferior pubic ramus bilaterally. Dissection was carried out to the edge of the bone itself but no dissection into the obturator internus muscle. Once the dissection was complete, the sling was placed. The Altis system was used. An index finger was placed in the vagina for guidance. The Altis trocar was placed into the pre-dissected tract. The handle was held in horizontal slight upward canting to avoid buttonholing of the sulcus. Cephalad drift was used to allow passage around the inferior pubic ramus. A thumb was placed on the heel of the introducer to allow a push/pivot maneuver to place a fixed anchor into the obturator internus muscle membrane complex on the patient's left side. Proper handle deviation confirmed proper anchor placement, as well as a gentle tugging on the sling. Once the introducer was removed, it also confirmed proper anchor placement. The exact same sequence of steps was repeated on the patient's right side with adjustable anchor. Once it was complete, the introducer was removed and  gentle tugging again confirmed proper anchor placement. The Valdes catheter was then used to drain the bladder and then it was removed and a diagnostic cystoscopy was performed by instilling 300 mL of fluid into the bladder. Both ureters were effluxing normally and there were no lacerations or injury in the lower urinary tract. We used Crede maneuver to elicit loss of fluid from the urethral meatus and there was loss of fluid noted. The tensioning suture was used to adjust the tape until there was minimal to no leakage with Crede. After appropriate tensioning, the tensioning suture was cut and the vaginal incision was closed with 2-0 Vicryl suture in a running locked stitch. At this time, we placed the Valdes back in the bladder.     Attention was then turned to the posterior compartment where 2 Allis clamps were placed in the posterior fourchette over the mucocutaneous border to reduce the markedly relaxed vaginal outlet. Dilute Marcaine solution was injected into the perineum. A emiliana-shaped incision was made extending from the vaginal mucosa onto the perineum. The overlying skin was removed en bloc. We then began closure of the posterior colporrhaphy with a 2-0 Vicryl suture in a running locked stitch. We reapproximated the perineal body with a 0-Vicryl interrupted suture. We closed the remainder of the colporrhaphy to the level of the hymen. We closed the perineal skin with 2-0 Vicryl in a subcuticular fashion.    No bladder, ureteral, viscus, or solid organ injury were noted at the end of the procedure. Irrigation was performed. We completed the procedure. Vaginal packing was placed in the vagina. There were no complications.The sponge, needle and instrument count were correct x2. The patient tolerated the procedure well and went to the recovery room in stable condition and she is stable at the moment of this dictation.    No qualified resident was available to assist with procedure. Dr. Sierra was present for  the entire procedure.      Patient Disposition:  PACU  and hemodynamically stable         SIGNATURE: Abby Jurado MD  DATE: Starla 10, 2025  TIME: 2:34 PM

## 2025-06-10 NOTE — ANESTHESIA POSTPROCEDURE EVALUATION
Post-Op Assessment Note    CV Status:  Stable    Pain management: adequate       Mental Status:  Awake   Hydration Status:  Stable   PONV Controlled:  Controlled   Airway Patency:  Patent     Post Op Vitals Reviewed: Yes    No anethesia notable event occurred.    Staff: Anesthesiologist           Last Filed PACU Vitals:  Vitals Value Taken Time   Temp 97.2 °F (36.2 °C) 06/10/25 14:47   Pulse 70 06/10/25 14:53   /62 06/10/25 14:47   Resp 20 06/10/25 14:47   SpO2 92 % 06/10/25 14:53   Vitals shown include unfiled device data.    Modified Nancy:     Vitals Value Taken Time   Activity 2 06/10/25 14:32   Respiration 2 06/10/25 14:32   Circulation 2 06/10/25 14:32   Consciousness 1 06/10/25 14:32   Oxygen Saturation 2 06/10/25 14:32     Modified Nancy Score: 9

## 2025-06-10 NOTE — ANESTHESIA PREPROCEDURE EVALUATION
Procedure:  COLPOSUSPENSION VAGINAL EXTRAPERITONEAL(ENPLACE); EUA (Vagina )  ANTERIOR/POSTERIOR COLPORRHAPHY (Perineum)  PUBOVAGINAL SLING (Vagina )  CYSTOSCOPY (Bladder)    Relevant Problems   ANESTHESIA (within normal limits)      CARDIO   (+) Mixed hyperlipidemia      /RENAL (within normal limits)      NEURO/PSYCH (within normal limits)      PULMONARY (within normal limits)      FEN/Gastrointestinal   (+) Diverticulosis      Other   (+) History of chronic cough   (+) Seasonal allergies        Physical Exam    Airway     Mallampati score: II          Cardiovascular  Cardiovascular exam normal    Dental   No notable dental hx     Pulmonary   Breath sounds clear to auscultation    Neurological    She appears awake, alert and oriented x3.      Other Findings  post-pubertal.      Anesthesia Plan  ASA Score- 2     Anesthesia Type- IV sedation with anesthesia with ASA Monitors.         Additional Monitors:     Airway Plan:            Plan Factors-    Chart reviewed.   Existing labs reviewed. Patient summary reviewed.    Patient is not a current smoker.              Induction-     Postoperative Plan- .   Monitoring Plan - Monitoring plan - standard ASA monitoring  Post Operative Pain Plan - multimodal analgesia        Informed Consent- Anesthetic plan and risks discussed with patient.        NPO Status:  No vitals data found for the desired time range.

## 2025-08-05 ENCOUNTER — OFFICE VISIT (OUTPATIENT)
Dept: FAMILY MEDICINE CLINIC | Facility: CLINIC | Age: 80
End: 2025-08-05
Payer: COMMERCIAL

## 2025-08-05 VITALS
OXYGEN SATURATION: 95 % | TEMPERATURE: 97.9 F | SYSTOLIC BLOOD PRESSURE: 130 MMHG | RESPIRATION RATE: 16 BRPM | DIASTOLIC BLOOD PRESSURE: 68 MMHG | WEIGHT: 170 LBS | HEIGHT: 65 IN | BODY MASS INDEX: 28.32 KG/M2 | HEART RATE: 78 BPM

## 2025-08-05 DIAGNOSIS — E78.2 MIXED HYPERLIPIDEMIA: Primary | ICD-10-CM

## 2025-08-05 DIAGNOSIS — R05.3 CHRONIC COUGH: ICD-10-CM

## 2025-08-05 DIAGNOSIS — K21.9 GASTROESOPHAGEAL REFLUX DISEASE, UNSPECIFIED WHETHER ESOPHAGITIS PRESENT: ICD-10-CM

## 2025-08-05 DIAGNOSIS — Z00.00 MEDICARE ANNUAL WELLNESS VISIT, SUBSEQUENT: ICD-10-CM

## 2025-08-05 PROCEDURE — G0439 PPPS, SUBSEQ VISIT: HCPCS | Performed by: NURSE PRACTITIONER

## (undated) DEVICE — PREMIUM DRY TRAY LF: Brand: MEDLINE INDUSTRIES, INC.

## (undated) DEVICE — REM POLYHESIVE ADULT PATIENT RETURN ELECTRODE: Brand: VALLEYLAB

## (undated) DEVICE — SCD SEQUENTIAL COMPRESSION COMFORT SLEEVE MEDIUM KNEE LENGTH: Brand: KENDALL SCD

## (undated) DEVICE — TUBING SUCTION 5MM X 12 FT

## (undated) DEVICE — NEEDLE HYPO 23G X 1-1/2 IN

## (undated) DEVICE — DECANTER: Brand: UNBRANDED

## (undated) DEVICE — INTENT TO BE USED WITH SUTURE MATERIAL FOR TISSUE CLOSURE: Brand: RICHARD-ALLAN® NEEDLE 1/2 CIRCLE TAPER

## (undated) DEVICE — CAUTERY TIP POLISHER: Brand: DEVON

## (undated) DEVICE — EXIDINE 4 PCT

## (undated) DEVICE — IV FLUSH NSS 10ML POSIFLUSH

## (undated) DEVICE — MEDI-VAC YANKAUER SUCTION HANDLE W/BULBOUS AND CONTROL VENT: Brand: CARDINAL HEALTH

## (undated) DEVICE — NEEDLE SPINAL 22G X 7IN QUINCKE

## (undated) DEVICE — INTENDED FOR TISSUE SEPARATION, AND OTHER PROCEDURES THAT REQUIRE A SHARP SURGICAL BLADE TO PUNCTURE OR CUT.: Brand: BARD-PARKER SAFETY BLADES SIZE 11, STERILE

## (undated) DEVICE — CATH FOLEY 18FR 5ML 2 WAY UNCOATED SILICONE

## (undated) DEVICE — 2000CC GUARDIAN II: Brand: GUARDIAN

## (undated) DEVICE — UNDER BUTTOCKS DRAPE W/FLUID CONTROL POUCH: Brand: CONVERTORS

## (undated) DEVICE — ALLENTOWN DR  LUCENTE S LAP PK: Brand: CARDINAL HEALTH

## (undated) DEVICE — SPONGE CHERRY 1/2IN

## (undated) DEVICE — SUT PDS II 2-0 CT-2 27 IN Z333H

## (undated) DEVICE — SUT VICRYL 0 CT-1 36 IN J946H

## (undated) DEVICE — PACKING VAGINAL 2 IN

## (undated) DEVICE — BULB SYRINGE, IRRIGATION WITH PROTECTIVE CAP, 60 CC, INDIVIDUALLY WRAPPED: Brand: DOVER

## (undated) DEVICE — SUT VICRYL 2-0 SH 27 IN UNDYED J417H

## (undated) DEVICE — SMOKE EVACUATION TUBING WITH 8 IN INTEGRAL WAND AND SPONGE GUARD: Brand: BUFFALO FILTER

## (undated) DEVICE — SYRINGE 3ML LL

## (undated) DEVICE — ELECTROSURGICAL DEVICE HOLSTER;FOR USE WITH MAXIMUM PEAK VOLTAGE OF 4000 V: Brand: FORCE TRIVERSE

## (undated) DEVICE — GLOVE PI ULTRA TOUCH SZ.8.0